# Patient Record
Sex: MALE | Race: WHITE | Employment: OTHER | URBAN - METROPOLITAN AREA
[De-identification: names, ages, dates, MRNs, and addresses within clinical notes are randomized per-mention and may not be internally consistent; named-entity substitution may affect disease eponyms.]

---

## 2018-09-26 ENCOUNTER — APPOINTMENT (OUTPATIENT)
Dept: CT IMAGING | Age: 78
DRG: 689 | End: 2018-09-26
Attending: EMERGENCY MEDICINE
Payer: MEDICARE

## 2018-09-26 ENCOUNTER — HOSPITAL ENCOUNTER (INPATIENT)
Age: 78
LOS: 6 days | Discharge: NURSING, REHAB OR LONG-TERM CARE | DRG: 689 | End: 2018-10-02
Attending: EMERGENCY MEDICINE | Admitting: FAMILY MEDICINE
Payer: MEDICARE

## 2018-09-26 DIAGNOSIS — R74.8 ELEVATED CPK: ICD-10-CM

## 2018-09-26 DIAGNOSIS — N39.0 ACUTE UTI: Primary | ICD-10-CM

## 2018-09-26 DIAGNOSIS — R79.89 ELEVATED LACTIC ACID LEVEL: ICD-10-CM
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
## 2018-09-26 PROBLEM — G93.40 ENCEPHALOPATHY: Status: ACTIVE | Noted: 2018-09-26

## 2018-09-26 PROBLEM — M62.82 RHABDOMYOLYSIS: Status: ACTIVE | Noted: 2018-09-26

## 2018-09-26 PROBLEM — E87.6 HYPOKALEMIA: Status: ACTIVE | Noted: 2018-09-26

## 2018-09-26 PROBLEM — R33.9 CHRONIC RETENTION OF URINE: Status: ACTIVE | Noted: 2018-09-26

## 2018-09-26 LAB
ALBUMIN SERPL-MCNC: 3.7 G/DL (ref 3.4–5)
ALBUMIN/GLOB SERPL: 0.8 {RATIO} (ref 0.8–1.7)
ALP SERPL-CCNC: 90 U/L (ref 45–117)
ALT SERPL-CCNC: 22 U/L (ref 16–61)
ANION GAP SERPL CALC-SCNC: 11 MMOL/L (ref 3–18)
APPEARANCE UR: ABNORMAL
AST SERPL-CCNC: 36 U/L (ref 15–37)
ATRIAL RATE: 66 BPM
BACTERIA URNS QL MICRO: ABNORMAL /HPF
BASOPHILS # BLD: 0 K/UL (ref 0–0.1)
BASOPHILS NFR BLD: 0 % (ref 0–2)
BILIRUB SERPL-MCNC: 0.8 MG/DL (ref 0.2–1)
BILIRUB UR QL: NEGATIVE
BUN SERPL-MCNC: 37 MG/DL (ref 7–18)
BUN/CREAT SERPL: 33 (ref 12–20)
CALCIUM SERPL-MCNC: 9 MG/DL (ref 8.5–10.1)
CALCULATED P AXIS, ECG09: 91 DEGREES
CALCULATED R AXIS, ECG10: 82 DEGREES
CALCULATED T AXIS, ECG11: 64 DEGREES
CHLORIDE SERPL-SCNC: 104 MMOL/L (ref 100–108)
CK MB CFR SERPL CALC: 2.5 % (ref 0–4)
CK MB SERPL-MCNC: 17.9 NG/ML (ref 5–25)
CK SERPL-CCNC: 711 U/L (ref 39–308)
CO2 SERPL-SCNC: 26 MMOL/L (ref 21–32)
COLOR UR: YELLOW
CREAT SERPL-MCNC: 1.13 MG/DL (ref 0.6–1.3)
DIAGNOSIS, 93000: NORMAL
DIFFERENTIAL METHOD BLD: ABNORMAL
EOSINOPHIL # BLD: 0 K/UL (ref 0–0.4)
EOSINOPHIL NFR BLD: 0 % (ref 0–5)
EPITH CASTS URNS QL MICRO: ABNORMAL /LPF (ref 0–5)
ERYTHROCYTE [DISTWIDTH] IN BLOOD BY AUTOMATED COUNT: 13 % (ref 11.6–14.5)
GLOBULIN SER CALC-MCNC: 4.5 G/DL (ref 2–4)
GLUCOSE SERPL-MCNC: 101 MG/DL (ref 74–99)
GLUCOSE UR STRIP.AUTO-MCNC: NEGATIVE MG/DL
HCT VFR BLD AUTO: 35.6 % (ref 36–48)
HGB BLD-MCNC: 12.2 G/DL (ref 13–16)
HGB UR QL STRIP: ABNORMAL
KETONES UR QL STRIP.AUTO: 15 MG/DL
LACTATE BLD-SCNC: 0.9 MMOL/L (ref 0.4–2)
LACTATE BLD-SCNC: 2.1 MMOL/L (ref 0.4–2)
LEUKOCYTE ESTERASE UR QL STRIP.AUTO: ABNORMAL
LIPASE SERPL-CCNC: 64 U/L (ref 73–393)
LYMPHOCYTES # BLD: 1.1 K/UL (ref 0.9–3.6)
LYMPHOCYTES NFR BLD: 12 % (ref 21–52)
MAGNESIUM SERPL-MCNC: 2.2 MG/DL (ref 1.6–2.6)
MCH RBC QN AUTO: 32 PG (ref 24–34)
MCHC RBC AUTO-ENTMCNC: 34.3 G/DL (ref 31–37)
MCV RBC AUTO: 93.4 FL (ref 74–97)
MONOCYTES # BLD: 0.8 K/UL (ref 0.05–1.2)
MONOCYTES NFR BLD: 8 % (ref 3–10)
NEUTS SEG # BLD: 7.3 K/UL (ref 1.8–8)
NEUTS SEG NFR BLD: 80 % (ref 40–73)
NITRITE UR QL STRIP.AUTO: NEGATIVE
P-R INTERVAL, ECG05: 152 MS
PH UR STRIP: 5.5 [PH] (ref 5–8)
PLATELET # BLD AUTO: 243 K/UL (ref 135–420)
PMV BLD AUTO: 9.3 FL (ref 9.2–11.8)
POTASSIUM SERPL-SCNC: 3.3 MMOL/L (ref 3.5–5.5)
PROT SERPL-MCNC: 8.2 G/DL (ref 6.4–8.2)
PROT UR STRIP-MCNC: 100 MG/DL
Q-T INTERVAL, ECG07: 426 MS
QRS DURATION, ECG06: 94 MS
QTC CALCULATION (BEZET), ECG08: 450 MS
RBC # BLD AUTO: 3.81 M/UL (ref 4.7–5.5)
RBC #/AREA URNS HPF: ABNORMAL /HPF (ref 0–5)
SODIUM SERPL-SCNC: 141 MMOL/L (ref 136–145)
SP GR UR REFRACTOMETRY: 1.02 (ref 1–1.03)
TROPONIN I SERPL-MCNC: <0.02 NG/ML (ref 0–0.04)
UROBILINOGEN UR QL STRIP.AUTO: 0.2 EU/DL (ref 0.2–1)
VENTRICULAR RATE, ECG03: 67 BPM
WBC # BLD AUTO: 9.2 K/UL (ref 4.6–13.2)
WBC URNS QL MICRO: ABNORMAL /HPF (ref 0–4)

## 2018-09-26 PROCEDURE — 83605 ASSAY OF LACTIC ACID: CPT

## 2018-09-26 PROCEDURE — 93005 ELECTROCARDIOGRAM TRACING: CPT

## 2018-09-26 PROCEDURE — 87086 URINE CULTURE/COLONY COUNT: CPT | Performed by: EMERGENCY MEDICINE

## 2018-09-26 PROCEDURE — 99285 EMERGENCY DEPT VISIT HI MDM: CPT

## 2018-09-26 PROCEDURE — 82550 ASSAY OF CK (CPK): CPT | Performed by: EMERGENCY MEDICINE

## 2018-09-26 PROCEDURE — 87040 BLOOD CULTURE FOR BACTERIA: CPT | Performed by: EMERGENCY MEDICINE

## 2018-09-26 PROCEDURE — 65270000029 HC RM PRIVATE

## 2018-09-26 PROCEDURE — 83690 ASSAY OF LIPASE: CPT | Performed by: EMERGENCY MEDICINE

## 2018-09-26 PROCEDURE — 74011250636 HC RX REV CODE- 250/636: Performed by: EMERGENCY MEDICINE

## 2018-09-26 PROCEDURE — 74011250636 HC RX REV CODE- 250/636: Performed by: NURSE PRACTITIONER

## 2018-09-26 PROCEDURE — 77030005514 HC CATH URETH FOL14 BARD -A

## 2018-09-26 PROCEDURE — 96361 HYDRATE IV INFUSION ADD-ON: CPT

## 2018-09-26 PROCEDURE — 74011000258 HC RX REV CODE- 258: Performed by: NURSE PRACTITIONER

## 2018-09-26 PROCEDURE — 77030032490 HC SLV COMPR SCD KNE COVD -B

## 2018-09-26 PROCEDURE — 87077 CULTURE AEROBIC IDENTIFY: CPT | Performed by: EMERGENCY MEDICINE

## 2018-09-26 PROCEDURE — 85025 COMPLETE CBC W/AUTO DIFF WBC: CPT | Performed by: EMERGENCY MEDICINE

## 2018-09-26 PROCEDURE — 83735 ASSAY OF MAGNESIUM: CPT | Performed by: EMERGENCY MEDICINE

## 2018-09-26 PROCEDURE — 87186 SC STD MICRODIL/AGAR DIL: CPT | Performed by: EMERGENCY MEDICINE

## 2018-09-26 PROCEDURE — 70450 CT HEAD/BRAIN W/O DYE: CPT

## 2018-09-26 PROCEDURE — 96374 THER/PROPH/DIAG INJ IV PUSH: CPT

## 2018-09-26 PROCEDURE — 80053 COMPREHEN METABOLIC PANEL: CPT | Performed by: EMERGENCY MEDICINE

## 2018-09-26 PROCEDURE — 74011250637 HC RX REV CODE- 250/637: Performed by: FAMILY MEDICINE

## 2018-09-26 PROCEDURE — 51702 INSERT TEMP BLADDER CATH: CPT

## 2018-09-26 PROCEDURE — 81001 URINALYSIS AUTO W/SCOPE: CPT | Performed by: EMERGENCY MEDICINE

## 2018-09-26 RX ORDER — ACETAMINOPHEN 325 MG/1
650 TABLET ORAL
Status: DISCONTINUED | OUTPATIENT
Start: 2018-09-26 | End: 2018-10-02 | Stop reason: HOSPADM

## 2018-09-26 RX ORDER — HYDROCODONE BITARTRATE AND ACETAMINOPHEN 5; 325 MG/1; MG/1
1 TABLET ORAL
Status: DISCONTINUED | OUTPATIENT
Start: 2018-09-26 | End: 2018-10-02 | Stop reason: HOSPADM

## 2018-09-26 RX ORDER — POTASSIUM CHLORIDE 750 MG/1
40 TABLET, FILM COATED, EXTENDED RELEASE ORAL DAILY
Status: DISCONTINUED | OUTPATIENT
Start: 2018-09-26 | End: 2018-10-02 | Stop reason: HOSPADM

## 2018-09-26 RX ORDER — CEFTRIAXONE 1 G/1
1 INJECTION, POWDER, FOR SOLUTION INTRAMUSCULAR; INTRAVENOUS
Status: COMPLETED | OUTPATIENT
Start: 2018-09-26 | End: 2018-09-26

## 2018-09-26 RX ORDER — SODIUM CHLORIDE 9 MG/ML
75 INJECTION, SOLUTION INTRAVENOUS CONTINUOUS
Status: DISCONTINUED | OUTPATIENT
Start: 2018-09-26 | End: 2018-10-02 | Stop reason: HOSPADM

## 2018-09-26 RX ORDER — HEPARIN SODIUM 5000 [USP'U]/ML
5000 INJECTION, SOLUTION INTRAVENOUS; SUBCUTANEOUS EVERY 8 HOURS
Status: DISCONTINUED | OUTPATIENT
Start: 2018-09-26 | End: 2018-10-02 | Stop reason: HOSPADM

## 2018-09-26 RX ORDER — SODIUM CHLORIDE 9 MG/ML
150 INJECTION, SOLUTION INTRAVENOUS CONTINUOUS
Status: DISCONTINUED | OUTPATIENT
Start: 2018-09-26 | End: 2018-09-26

## 2018-09-26 RX ADMIN — SODIUM CHLORIDE 75 ML/HR: 900 INJECTION, SOLUTION INTRAVENOUS at 13:47

## 2018-09-26 RX ADMIN — CEFTRIAXONE 1 G: 1 INJECTION, POWDER, FOR SOLUTION INTRAMUSCULAR; INTRAVENOUS at 13:00

## 2018-09-26 RX ADMIN — CEFTRIAXONE 1 G: 1 INJECTION, POWDER, FOR SOLUTION INTRAMUSCULAR; INTRAVENOUS at 08:45

## 2018-09-26 RX ADMIN — SODIUM CHLORIDE 1000 ML: 900 INJECTION, SOLUTION INTRAVENOUS at 08:41

## 2018-09-26 RX ADMIN — SODIUM CHLORIDE 75 ML/HR: 900 INJECTION, SOLUTION INTRAVENOUS at 23:24

## 2018-09-26 RX ADMIN — HEPARIN SODIUM 5000 UNITS: 5000 INJECTION INTRAVENOUS; SUBCUTANEOUS at 23:22

## 2018-09-26 RX ADMIN — SODIUM CHLORIDE 150 ML/HR: 900 INJECTION, SOLUTION INTRAVENOUS at 10:07

## 2018-09-26 NOTE — H&P
GENERAL GENERIC H&P/CONSULT Tristan Carolina is a 70 y.o. male with a history of Parkinson's disease, BPH who presents to the ED for complaints of fall with unknown downtime. Patient was found by daughter down on bathroom floor and apparently was last seen normal yesterday evening. Unclear if patient hit his head. Hx is limited 2/2 patient condition. Family notes patient has been steadily declining in regards to conditioning however has refused to see PCP. Patient lives by himself. Patient notes his legs gave out. In ED patient was found to have UTI. He will be admitted for further eval.  
 
 
Past Medical History:  
Diagnosis Date  Enlarged prostate  Nocturia  Parkinson's disease ([address]  Vitamin D deficiency History reviewed. No pertinent surgical history. Prior to Admission medications Not on File No Known Allergies Social History Substance Use Topics  Smoking status: Former Smoker  Smokeless tobacco: Former User  Alcohol use No  
  
History reviewed. No pertinent family history. Review of Systems Unable to perform ROS: Mental status change Objective: 
 
09/26 0701 - 09/26 1900 In: -  
Out: 1000 [Urine:1000] Patient Vitals for the past 8 hrs: 
 BP Temp Pulse Resp SpO2 Height Weight  
09/26/18 1130 111/57 - - - 100 % - -  
09/26/18 1115 - - - - 100 % - -  
09/26/18 1100 122/64 - - - (!) 89 % - -  
09/26/18 1030 118/63 - - - 97 % - -  
09/26/18 1015 - - - - 97 % - -  
09/26/18 1000 124/67 - - - - - -  
09/26/18 0930 108/68 - 66 14 100 % - -  
09/26/18 0751 128/58 97.5 °F (36.4 °C) 74 15 92 % 5' 11\" (1.803 m) 65.8 kg (145 lb) Physical Exam  
Constitutional: No distress. HENT:  
Head: Normocephalic. Eyes: Pupils are equal, round, and reactive to light. Neck: Normal range of motion. No JVD present. Cardiovascular: Normal rate. Pulmonary/Chest: Effort normal and breath sounds normal. No stridor. Abdominal: Soft.  Bowel sounds are normal.  
 Musculoskeletal: Normal range of motion. Neurological: He is alert. Skin: Skin is warm and dry. He is not diaphoretic. Labs:   
Recent Results (from the past 24 hour(s)) URINALYSIS W/ RFLX MICROSCOPIC Collection Time: 09/26/18  8:10 AM  
Result Value Ref Range Color YELLOW Appearance CLOUDY Specific gravity 1.016 1.005 - 1.030    
 pH (UA) 5.5 5.0 - 8.0 Protein 100 (A) NEG mg/dL Glucose NEGATIVE  NEG mg/dL Ketone 15 (A) NEG mg/dL Bilirubin NEGATIVE  NEG Blood MODERATE (A) NEG Urobilinogen 0.2 0.2 - 1.0 EU/dL Nitrites NEGATIVE  NEG Leukocyte Esterase LARGE (A) NEG URINE MICROSCOPIC ONLY Collection Time: 09/26/18  8:10 AM  
Result Value Ref Range WBC TOO NUMEROUS TO COUNT 0 - 4 /hpf  
 RBC 36 to 50 0 - 5 /hpf Epithelial cells 4+ 0 - 5 /lpf Bacteria 4+ (A) NEG /hpf  
EKG, 12 LEAD, INITIAL Collection Time: 09/26/18  8:17 AM  
Result Value Ref Range Ventricular Rate 67 BPM  
 Atrial Rate 66 BPM  
 P-R Interval 152 ms QRS Duration 94 ms Q-T Interval 426 ms  
 QTC Calculation (Bezet) 450 ms Calculated P Axis 91 degrees Calculated R Axis 82 degrees Calculated T Axis 64 degrees Diagnosis Sinus rhythm No previous ECGs available Confirmed by Bunny Young ([id number]) on 9/26/2018 10:27:13 AM 
  
CBC WITH AUTOMATED DIFF Collection Time: 09/26/18  8:20 AM  
Result Value Ref Range WBC 9.2 4.6 - 13.2 K/uL  
 RBC 3.81 (L) 4.70 - 5.50 M/uL  
 HGB 12.2 (L) 13.0 - 16.0 g/dL HCT 35.6 (L) 36.0 - 48.0 % MCV 93.4 74.0 - 97.0 FL  
 MCH 32.0 24.0 - 34.0 PG  
 MCHC 34.3 31.0 - 37.0 g/dL  
 RDW 13.0 11.6 - 14.5 % PLATELET 566 377 - 629 K/uL MPV 9.3 9.2 - 11.8 FL  
 NEUTROPHILS 80 (H) 40 - 73 % LYMPHOCYTES 12 (L) 21 - 52 % MONOCYTES 8 3 - 10 % EOSINOPHILS 0 0 - 5 % BASOPHILS 0 0 - 2 %  
 ABS. NEUTROPHILS 7.3 1.8 - 8.0 K/UL  
 ABS. LYMPHOCYTES 1.1 0.9 - 3.6 K/UL  
 ABS. MONOCYTES 0.8 0.05 - 1.2 K/UL ABS. EOSINOPHILS 0.0 0.0 - 0.4 K/UL  
 ABS. BASOPHILS 0.0 0.0 - 0.1 K/UL  
 DF AUTOMATED METABOLIC PANEL, COMPREHENSIVE Collection Time: 09/26/18  8:20 AM  
Result Value Ref Range Sodium 141 136 - 145 mmol/L Potassium 3.3 (L) 3.5 - 5.5 mmol/L Chloride 104 100 - 108 mmol/L  
 CO2 26 21 - 32 mmol/L Anion gap 11 3.0 - 18 mmol/L Glucose 101 (H) 74 - 99 mg/dL BUN 37 (H) 7.0 - 18 MG/DL Creatinine 1.13 0.6 - 1.3 MG/DL  
 BUN/Creatinine ratio 33 (H) 12 - 20 GFR est AA >60 >60 ml/min/1.73m2 GFR est non-AA >60 >60 ml/min/1.73m2 Calcium 9.0 8.5 - 10.1 MG/DL Bilirubin, total 0.8 0.2 - 1.0 MG/DL  
 ALT (SGPT) 22 16 - 61 U/L  
 AST (SGOT) 36 15 - 37 U/L Alk. phosphatase 90 45 - 117 U/L Protein, total 8.2 6.4 - 8.2 g/dL Albumin 3.7 3.4 - 5.0 g/dL Globulin 4.5 (H) 2.0 - 4.0 g/dL A-G Ratio 0.8 0.8 - 1.7 LIPASE Collection Time: 09/26/18  8:20 AM  
Result Value Ref Range Lipase 64 (L) 73 - 393 U/L  
CARDIAC PANEL,(CK, CKMB & TROPONIN) Collection Time: 09/26/18  8:20 AM  
Result Value Ref Range  (H) 39 - 308 U/L  
 CK - MB 17.9 (H) <3.6 ng/ml CK-MB Index 2.5 0.0 - 4.0 % Troponin-I, Qt. <0.02 0.0 - 0.045 NG/ML  
MAGNESIUM Collection Time: 09/26/18  8:20 AM  
Result Value Ref Range Magnesium 2.2 1.6 - 2.6 mg/dL CULTURE, BLOOD Collection Time: 09/26/18  8:30 AM  
Result Value Ref Range Special Requests: PERIPHERAL Culture result: PENDING   
POC LACTIC ACID Collection Time: 09/26/18  8:33 AM  
Result Value Ref Range Lactic Acid (POC) 2.1 (HH) 0.4 - 2.0 mmol/L  
CULTURE, BLOOD Collection Time: 09/26/18  8:40 AM  
Result Value Ref Range Special Requests: PERIPHERAL Culture result: PENDING   
POC LACTIC ACID Collection Time: 09/26/18 11:42 AM  
Result Value Ref Range Lactic Acid (POC) 0.9 0.4 - 2.0 mmol/L Assessment: 
Principal Problem: 
  UTI (urinary tract infection) (9/26/2018) Active Problems: Fall (9/26/2018) Chronic retention of urine (9/26/2018) Plan: 
 
Acute Metabolic Encephalpathy 2/2 UTI 
-rocephin 
-urine culture 
-HD stable -IVF 
-CT head negative Mild Rhabo 
-2/2 fall 
-monitor -IVF Fall  
-likely 2/2 dehydration/physical deconditioning 
-IVF 
-PT/OT 
-will need placement DVT prophylaxis 
-scd/teds 
-heparin sq Signed: 
Jannell Kanner, NP 9/26/2018

## 2018-09-26 NOTE — PROGRESS NOTES
Problem: Pressure Injury - Risk of 
Goal: *Prevention of pressure injury Document Salvador Scale and appropriate interventions in the flowsheet. Outcome: Progressing Towards Goal 
Pressure Injury Interventions: 
  
 
Moisture Interventions: Check for incontinence Q2 hours and as needed Activity Interventions: Pressure redistribution bed/mattress(bed type) Mobility Interventions: HOB 30 degrees or less Nutrition Interventions: Document food/fluid/supplement intake Problem: Falls - Risk of 
Goal: *Absence of Falls Document Matteo Hutton Fall Risk and appropriate interventions in the flowsheet. Outcome: Progressing Towards Goal 
Fall Risk Interventions: 
Mobility Interventions: Communicate number of staff needed for ambulation/transfer Mentation Interventions: Door open when patient unattended Elimination Interventions: Call light in reach History of Falls Interventions: Door open when patient unattended

## 2018-09-26 NOTE — IP AVS SNAPSHOT
[address] [address] 
[phone] Patient: Sun Hernandez MRN: [id number] PQF:6/68/7883 A check wilver indicates which time of day the medication should be taken. My Medications START taking these medications Instructions Each Dose to Equal  
 Morning Noon Evening Bedtime  
 acetaminophen 325 mg tablet Commonly known as:  TYLENOL Your last dose was: Your next dose is: Take 2 Tabs by mouth every four (4) hours as needed. 650 mg  
    
   
   
   
  
 amoxicillin 250 mg/5 mL suspension Commonly known as:  AMOXIL Your last dose was: Your next dose is: Take 5 mL by mouth three (3) times daily. 250 mg HYDROcodone-acetaminophen 5-325 mg per tablet Commonly known as:  David Ines Your last dose was: Your next dose is: Take 1 Tab by mouth every four (4) hours as needed. Max Daily Amount: 6 Tabs. 1 Tab Where to Get Your Medications Information on where to get these meds will be given to you by the nurse or doctor. ! Ask your nurse or doctor about these medications  
  acetaminophen 325 mg tablet  
 amoxicillin 250 mg/5 mL suspension HYDROcodone-acetaminophen 5-325 mg per tablet

## 2018-09-26 NOTE — PROGRESS NOTES
Pt arrived on unit via stretcher from ER. He is AOx3 with periodic confusion which is not his baseline according to his daughter at the bedside. Pt is usually AOx4 and independent. Pt is on room air and has a urinary atdeo catheter in place with a lot of sediment in his urine and the urine is cloudy. He is non-tele and usually straight cath's himself at home due to a history of an enlarged prostate. He has a history of Parkingson's tremors which his he states has gotten worse over the last few months.

## 2018-09-26 NOTE — PROGRESS NOTES
1916  Received bedside verbal shift report from [facility]. Pt lying in bed resting comfortably. Piv #18 to left a/c with ns infusing at 75 ml/hr. Dobbins cath to gravity with cloudy yellow urine draining. Call bell within reach, side rails up x 3, bed low and locked. No complaints offered, pt instructed to call for assistance. 2015  Notified by CNA pt refusing vital signs 12  Notified by maria guadalupe chen critical lab result pt with positive blood cultures. MD notified by Kulwant Palomo. Bedside and Verbal shift change report given to Raquel Field (oncoming nurse) by Ursula Jerry (offgoing nurse). Report included the following information SBAR, Kardex, Intake/Output, MAR and Recent Results.

## 2018-09-26 NOTE — ED NOTES
Family reports found pt on the floor this morning. Family doesn''t know how long he was on floor. Pt denies injury.

## 2018-09-26 NOTE — ED NOTES
TRANSFER - ED to INPATIENT REPORT: 
 
SBAR report made available to receiving floor on this patient being transferred to [address] [place] [address] ([facility])  for routine progression of care Admitting diagnosis UTI (urinary tract infection) Chronic retention of urine Fall Information from the following report(s) SBAR and ED Summary was made available to receiving floor. Lines:  
Peripheral IV 09/26/18 Antecubital (Active) Site Assessment Clean, dry, & intact 9/26/2018  8:15 AM  
Phlebitis Assessment 0 9/26/2018  8:15 AM  
Infiltration Assessment 0 9/26/2018  8:15 AM  
Dressing Status Clean, dry, & intact 9/26/2018  8:15 AM  
Dressing Type Transparent 9/26/2018  8:15 AM  
Hub Color/Line Status Flushed 9/26/2018  8:15 AM  
Action Taken Blood drawn 9/26/2018  8:15 AM  
  
 
Medication list confirmed with patient Opportunity for questions and clarification was provided. Patient is only aware of  place, person and situation lactic Patient is  incontinent and ambulatory with assist 
  
Valuables transported with patient Patient transported with: 
 [facility]

## 2018-09-26 NOTE — IP AVS SNAPSHOT
[address] [address] 
[phone] Patient: Terri Almaraz MRN: [id number] BRW:3/51/0171 About your hospitalization You were admitted on:  September 26, 2018 You last received care in the:  [address] [place] [address] You were discharged on:  October 2, 2018 Why you were hospitalized Your primary diagnosis was:  Uti (Urinary Tract Infection) Your diagnoses also included:  Fall, Chronic Retention Of Urine, Encephalopathy, Hypokalemia, Rhabdomyolysis, Bacteremia, Parkinson's Disease (Hcc), Bph (Benign Prostatic Hyperplasia) Follow-up Information Follow up With Details Comments Contact Info Redia Dakin, MD On 10/26/2018 [address] [address] 
[phone] 60 [facility])   [address] [place] [id number] [phone] Your Scheduled Appointments Friday October 26, 2018  8:00 AM EDT New Patient with Redia Dakin, MD  
[facility] ([address] [place] [address]) [address] [id number]  
[phone] Discharge Orders None A check wilver indicates which time of day the medication should be taken. My Medications START taking these medications Instructions Each Dose to Equal  
 Morning Noon Evening Bedtime  
 acetaminophen 325 mg tablet Commonly known as:  TYLENOL Your last dose was: Your next dose is: Take 2 Tabs by mouth every four (4) hours as needed. 650 mg  
    
   
   
   
  
 amoxicillin 250 mg/5 mL suspension Commonly known as:  AMOXIL Your last dose was: Your next dose is: Take 5 mL by mouth three (3) times daily. 250 mg HYDROcodone-acetaminophen 5-325 mg per tablet Commonly known as:  [place] Your last dose was: Your next dose is: Take 1 Tab by mouth every four (4) hours as needed. Max Daily Amount: 6 Tabs. 1 Tab Where to Get Your Medications Information on where to get these meds will be given to you by the nurse or doctor. ! Ask your nurse or doctor about these medications  
  acetaminophen 325 mg tablet  
 amoxicillin 250 mg/5 mL suspension HYDROcodone-acetaminophen 5-325 mg per tablet Opioid Education Prescription Opioids: What You Need to Know: 
 
Prescription opioids can be used to help relieve moderate-to-severe pain and are often prescribed following a surgery or injury, or for certain health conditions. These medications can be an important part of treatment but also come with serious risks. Opioids are strong pain medicines. Examples include hydrocodone, oxycodone, fentanyl, and morphine. Heroin is an example of an illegal opioid. It is important to work with your health care provider to make sure you are getting the safest, most effective care. WHAT ARE THE RISKS AND SIDE EFFECTS OF OPIOID USE? Prescription opioids carry serious risks of addiction and overdose, especially with prolonged use. An opioid overdose, often marked by slow breathing, can cause sudden death. The use of prescription opioids can have a number of side effects as well, even when taken as directed. · Tolerance-meaning you might need to take more of a medication for the same pain relief · Physical dependence-meaning you have symptoms of withdrawal when the medication is stopped. Withdrawal symptoms can include nausea, sweating, chills, diarrhea, stomach cramps, and muscle aches. Withdrawal can last up to several weeks, depending on which drug you took and how long you took it. · Increased sensitivity to pain · Constipation · Nausea, vomiting, and dry mouth · Sleepiness and dizziness · Confusion · Depression · Low levels of testosterone that can result in lower sex drive, energy, and strength · Itching and sweating RISKS ARE GREATER WITH:      
· History of drug misuse, substance use disorder, or overdose · Mental health conditions (such as depression or anxiety) · Sleep apnea · Older age (72 years or older) · Pregnancy Avoid alcohol while taking prescription opioids. Also, unless specifically advised by your health care provider, medications to avoid include: · Benzodiazepines (such as Xanax or Valium) · Muscle relaxants (such as Soma or Flexeril) · Hypnotics (such as Ambien or Lunesta) · Other prescription opioids KNOW YOUR OPTIONS Talk to your health care provider about ways to manage your pain that don't involve prescription opioids. Some of these options may actually work better and have fewer risks and side effects. Consult your physician before adding or stopping any medications, treatments, or physical activity. Options may include: 
· Pain relievers such as acetaminophen, ibuprofen, and naproxen · Some medications that are also used for depression or seizures · Physical therapy and exercise · Counseling to help patients learn how to cope better with triggers of pain and stress. · Application of heat or cold compress · Massage therapy · Relaxation techniques Be Informed Make sure you know the name of your medication, how much and how often to take it, and its potential risks & side effects. IF YOU ARE PRESCRIBED OPIOIDS FOR PAIN: 
· Never take opioids in greater amounts or more often than prescribed. Remember the goal is not to be pain-free but to manage your pain at a tolerable level. · Follow up with your primary care provider to: · Work together to create a plan on how to manage your pain. · Talk about ways to help manage your pain that don't involve prescription opioids. · Talk about any and all concerns and side effects. · Help prevent misuse and abuse. · Never sell or share prescription opioids · Help prevent misuse and abuse. · Store prescription opioids in a secure place and out of reach of others (this may include visitors, children, friends, and family). · Safely dispose of unused/unwanted prescription opioids: Find your community drug take-back program or your pharmacy mail-back program, or flush them down the toilet, following guidance from the Food and Drug Administration (www.fda.gov/Drugs/ResourcesForYou). · Visit www.cdc.gov/drugoverdose to learn about the risks of opioid abuse and overdose. · If you believe you may be struggling with addiction, tell your health care provider and ask for guidance or call [employer] at 7-044-872-PRQS. Discharge Instructions None [email or web address] Announcement We are excited to announce that we are making your provider's discharge notes available to you in [email or web address]. You will see these notes when they are completed and signed by the physician that discharged you from your recent hospital stay. If you have any questions or concerns about any information you see in [email or web address], please call the Health Information Department where you were seen or reach out to your Primary Care Provider for more information about your plan of care. Introducing [facility]! [employer] introduces Blaze.[email or web address] patient portal. Now you can access parts of your medical record, email your doctor's office, and request medication refills online. 1. In your internet browser, go to [email or web address] 2. Click on the First Time User? Click Here link in the Sign In box. You will see the New Member Sign Up page. 3. Enter your Blaze.io Access Code exactly as it appears below. You will not need to use this code after youve completed the sign-up process. If you do not sign up before the expiration date, you must request a new code.  
 
· [email or web address].io Access Code: [email or web address] 
 Expires: 12/25/2018  7:53 AM 
 
4. Enter the last four digits of your Social Security Number (xxxx) and Date of Birth (mm/dd/yyyy) as indicated and click Submit. You will be taken to the next sign-up page. 5. Create a Niterot ID. This will be your Terrajoule login ID and cannot be changed, so think of one that is secure and easy to remember. 6. Create a Terrajoule password. You can change your password at any time. 7. Enter your Password Reset Question and Answer. This can be used at a later time if you forget your password. 8. Enter your e-mail address. You will receive e-mail notification when new information is available in [address] 9. Click Sign Up. You can now view and download portions of your medical record. 10. Click the Download Summary menu link to download a portable copy of your medical information. If you have questions, please visit the Frequently Asked Questions section of the Terrajoule website. Remember, Terrajoule is NOT to be used for urgent needs. For medical emergencies, dial 911. Now available from your iPhone and Android! Introducing [facility] As a [facility] patient, I wanted to make you aware of our electronic visit tool called [facility]. [facility] 24/7 allows you to connect within minutes with a medical provider 24 hours a day, seven days a week via a mobile device or tablet or logging into a secure website from your computer. You can access [facility] from anywhere in the [place]. A virtual visit might be right for you when you have a simple condition and feel like you just dont want to get out of bed, or cant get away from work for an appointment, when your regular [facility] provider is not available (evenings, weekends or holidays), or when youre out of town and need minor care.   Electronic visits cost only $49 and if the [facility] Montes De Oca provider determines a prescription is needed to treat your condition, one can be electronically transmitted to a nearby pharmacy*. Please take a moment to enroll today if you have not already done so. The enrollment process is free and takes just a few minutes. To enroll, please download the [employer] 24/7 yossi to your tablet or phone, or visit [email or web address] to enroll on your computer. And, as an [address] [place] [address] patient with a Leversense account, the results of your visits will be scanned into your electronic medical record and your primary care provider will be able to view the scanned results. We urge you to continue to see your regular [employer] provider for your ongoing medical care. And while your primary care provider may not be the one available when you seek a Risktailjeanethfin virtual visit, the peace of mind you get from getting a real diagnosis real time can be priceless. For more information on StackAdapt, view our Frequently Asked Questions (FAQs) at [email or web address]. Sincerely, 
 
Gen Jorge MD 
Chief Medical Officer [address] [place] [address] *:  certain medications cannot be prescribed via StackAdapt Providers Seen During Your Hospitalization Provider Specialty Primary office phone Mathew Mccann MD Emergency Medicine [phone] Nila Stevenson MD Family Practice [phone] Alexandra Rojas MD Internal Medicine [phone] Your Primary Care Physician (PCP) Primary Care Physician Office Phone Office Fax Sheba Barrera [phone] You are allergic to the following No active allergies Recent Documentation Height Weight BMI Smoking Status 1.803 m 64 kg 19.68 kg/m2 Former Smoker Emergency Contacts Name Discharge Info Relation Home Work Mobile Chelsie Guillen DISCHARGE CAREGIVER [3] Child [2] [phone] Phoenix Guillen DISCHARGE CAREGIVER [3] Other Relative [6] [phone] Patient Belongings The following personal items are in your possession at time of discharge: 
  Dental Appliances: None  Visual Aid: At bedside      Home Medications: None   Jewelry: None  Clothing: None    Other Valuables: None (All sent home with patient ) Please provide this summary of care documentation to your next provider. Signatures-by signing, you are acknowledging that this After Visit Summary has been reviewed with you and you have received a copy. Patient Signature:  ____________________________________________________________ Date:  ____________________________________________________________  
  
[facility] Provider Signature:  ____________________________________________________________ Date:  ____________________________________________________________

## 2018-09-26 NOTE — PROGRESS NOTES
Patient is unable to communicate at this time due to his current condition  Spoke to family member of patient.  offered prayer . Chaplains will continue to follow and will provide pastoral care on an as needed/requested basis. Mira San Board Certified [employer] Spiritual Care  
[phone]

## 2018-09-26 NOTE — ED TRIAGE NOTES
Per daughter her father has been declining over the past year. He refuses to go see his doctors. She thinks he is dehydrated, he cathes at home. Pt reports abdominal pain .

## 2018-09-26 NOTE — PROGRESS NOTES
Reason for Admission:  UTI RRAT Score:  5 Plan for utilizing home health:   No    
                 
Likelihood of Readmission:  Moderate/yellow Transition of Care Plan:  Jacoby Stevenson Pt confused. Interviewed daughter at bedside. Pt lives alone. Pt ambulate independently but he shuffles. Daughter, Bhavna Sifuentes, assist pt with chores at home. Pt self cath for many years but lately has trouble and holds his urine. Daughter states that she tried to move pt at her house but pt refused. Pt has no PCP and daughter not sure when was the last time pt seen a MD. Pt agreed with discharge plan. Care Management Interventions PCP Verified by CM: Yes (no PCP) Palliative Care Criteria Met (RRAT>21 & CHF Dx)?: No 
Mode of Transport at Discharge: BLS Transition of Care Consult (CM Consult): SNF, Discharge Planning Physical Therapy Consult: Yes Occupational Therapy Consult: Yes Current Support Network: Lives Alone Confirm Follow Up Transport: Family Plan discussed with Pt/Family/Caregiver: Yes Discharge Location Discharge Placement: Transferred per family's request  
 
 
Patient has designated _______daughter_________________ to participate in his/her discharge plan and to receive any needed information. Name: Bhavna Sifuentes Address: 
Phone number:  [phone]

## 2018-09-27 PROBLEM — R78.81 BACTEREMIA: Status: ACTIVE | Noted: 2018-09-27

## 2018-09-27 LAB
ANION GAP SERPL CALC-SCNC: 12 MMOL/L (ref 3–18)
BUN SERPL-MCNC: 30 MG/DL (ref 7–18)
BUN/CREAT SERPL: 39 (ref 12–20)
CALCIUM SERPL-MCNC: 8.5 MG/DL (ref 8.5–10.1)
CHLORIDE SERPL-SCNC: 109 MMOL/L (ref 100–108)
CK SERPL-CCNC: 2282 U/L (ref 39–308)
CO2 SERPL-SCNC: 22 MMOL/L (ref 21–32)
CREAT SERPL-MCNC: 0.77 MG/DL (ref 0.6–1.3)
ERYTHROCYTE [DISTWIDTH] IN BLOOD BY AUTOMATED COUNT: 13.2 % (ref 11.6–14.5)
GLUCOSE SERPL-MCNC: 81 MG/DL (ref 74–99)
HCT VFR BLD AUTO: 35.7 % (ref 36–48)
HGB BLD-MCNC: 11.8 G/DL (ref 13–16)
MCH RBC QN AUTO: 31.1 PG (ref 24–34)
MCHC RBC AUTO-ENTMCNC: 33.1 G/DL (ref 31–37)
MCV RBC AUTO: 94.2 FL (ref 74–97)
PLATELET # BLD AUTO: 270 K/UL (ref 135–420)
PMV BLD AUTO: 10.5 FL (ref 9.2–11.8)
POTASSIUM SERPL-SCNC: 3.8 MMOL/L (ref 3.5–5.5)
RBC # BLD AUTO: 3.79 M/UL (ref 4.7–5.5)
SODIUM SERPL-SCNC: 143 MMOL/L (ref 136–145)
WBC # BLD AUTO: 8.7 K/UL (ref 4.6–13.2)

## 2018-09-27 PROCEDURE — 36415 COLL VENOUS BLD VENIPUNCTURE: CPT | Performed by: NURSE PRACTITIONER

## 2018-09-27 PROCEDURE — 97165 OT EVAL LOW COMPLEX 30 MIN: CPT

## 2018-09-27 PROCEDURE — 74011250636 HC RX REV CODE- 250/636: Performed by: NURSE PRACTITIONER

## 2018-09-27 PROCEDURE — 65270000029 HC RM PRIVATE

## 2018-09-27 PROCEDURE — 97530 THERAPEUTIC ACTIVITIES: CPT

## 2018-09-27 PROCEDURE — 82550 ASSAY OF CK (CPK): CPT | Performed by: FAMILY MEDICINE

## 2018-09-27 PROCEDURE — 74011000258 HC RX REV CODE- 258: Performed by: NURSE PRACTITIONER

## 2018-09-27 PROCEDURE — 85027 COMPLETE CBC AUTOMATED: CPT | Performed by: NURSE PRACTITIONER

## 2018-09-27 PROCEDURE — 97162 PT EVAL MOD COMPLEX 30 MIN: CPT

## 2018-09-27 PROCEDURE — 80048 BASIC METABOLIC PNL TOTAL CA: CPT | Performed by: NURSE PRACTITIONER

## 2018-09-27 PROCEDURE — 74011250636 HC RX REV CODE- 250/636: Performed by: INTERNAL MEDICINE

## 2018-09-27 PROCEDURE — 74011000258 HC RX REV CODE- 258: Performed by: INTERNAL MEDICINE

## 2018-09-27 PROCEDURE — 74011250637 HC RX REV CODE- 250/637: Performed by: FAMILY MEDICINE

## 2018-09-27 RX ADMIN — AMPICILLIN SODIUM 2 G: 2 INJECTION, POWDER, FOR SOLUTION INTRAMUSCULAR; INTRAVENOUS at 20:03

## 2018-09-27 RX ADMIN — AMPICILLIN SODIUM 2 G: 2 INJECTION, POWDER, FOR SOLUTION INTRAMUSCULAR; INTRAVENOUS at 23:53

## 2018-09-27 RX ADMIN — SODIUM CHLORIDE 75 ML/HR: 900 INJECTION, SOLUTION INTRAVENOUS at 13:26

## 2018-09-27 RX ADMIN — POTASSIUM CHLORIDE 40 MEQ: 750 TABLET, FILM COATED, EXTENDED RELEASE ORAL at 10:26

## 2018-09-27 RX ADMIN — AMPICILLIN SODIUM 2 G: 2 INJECTION, POWDER, FOR SOLUTION INTRAMUSCULAR; INTRAVENOUS at 16:02

## 2018-09-27 RX ADMIN — HEPARIN SODIUM 5000 UNITS: 5000 INJECTION INTRAVENOUS; SUBCUTANEOUS at 20:05

## 2018-09-27 RX ADMIN — HEPARIN SODIUM 5000 UNITS: 5000 INJECTION INTRAVENOUS; SUBCUTANEOUS at 05:55

## 2018-09-27 RX ADMIN — CEFTRIAXONE 1 G: 1 INJECTION, POWDER, FOR SOLUTION INTRAMUSCULAR; INTRAVENOUS at 13:27

## 2018-09-27 NOTE — PROGRESS NOTES
Progress Note Patient: Reva Shrestha               Sex: male          DOA: 9/26/2018 YOB: 1940      Age:  70 y.o.        LOS:  LOS: 1 day Subjective / Interval Hx I Erle Neat a 70 y. o. male with a history of Parkinson's disease, BPH who presents to the ED for complaints of fall with unknown downtime. Patient was found by daughter down on bathroom floor and apparently was last seen normal yesterday evening. Unclear if patient hit his head. Hx is limited 2/2 patient condition. Family notes patient has been steadily declining in regards to conditioning however has refused to see PCP. Patient lives by himself. Patient notes his legs gave out. In ED patient was found to have UTI. He will be admitted for further eval.  
9/27: Patient appears more confused today. 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 Blood cx + GPR.  
 
  
 
Objective:  
  
Visit Vitals  /60  Pulse 76  Temp 99.1 °F (37.3 °C)  Resp 17  Ht 5' 11\" (1.803 m)  Wt 66.1 kg (145 lb 11.2 oz)  SpO2 96%  BMI 20.32 kg/m2 Physical Exam  
Constitutional: He appears well-developed and well-nourished. He appears ill. No distress. HENT:  
Head: Normocephalic and atraumatic. Eyes: Conjunctivae are normal. Pupils are equal, round, and reactive to light. No scleral icterus. Neck: Neck supple. Cardiovascular: Normal rate, regular rhythm and normal heart sounds. No murmur heard. Pulmonary/Chest: Effort normal and breath sounds normal. No respiratory distress. He has no wheezes. He has no rales. Abdominal: Soft. Bowel sounds are normal. He exhibits no distension. There is no tenderness. There is no guarding. Musculoskeletal: He exhibits edema. Neurological: He is alert. ORIENTED TO PLACE AND PERSON Skin: He is not diaphoretic. Intake and Output: 
Current Shift:    
Last three shifts:  09/25 1901 - 09/27 0700 In: 1337.5 [I.V.:1337.5] Out: 1960 [[id number]] Recent Results (from the past 48 hour(s)) URINALYSIS W/ RFLX MICROSCOPIC Collection Time: 09/26/18  8:10 AM  
Result Value Ref Range Color YELLOW Appearance CLOUDY Specific gravity 1.016 1.005 - 1.030    
 pH (UA) 5.5 5.0 - 8.0 Protein 100 (A) NEG mg/dL Glucose NEGATIVE  NEG mg/dL Ketone 15 (A) NEG mg/dL Bilirubin NEGATIVE  NEG Blood MODERATE (A) NEG Urobilinogen 0.2 0.2 - 1.0 EU/dL Nitrites NEGATIVE  NEG Leukocyte Esterase LARGE (A) NEG URINE MICROSCOPIC ONLY Collection Time: 09/26/18  8:10 AM  
Result Value Ref Range WBC TOO NUMEROUS TO COUNT 0 - 4 /hpf  
 RBC 36 to 50 0 - 5 /hpf Epithelial cells 4+ 0 - 5 /lpf Bacteria 4+ (A) NEG /hpf  
EKG, 12 LEAD, INITIAL Collection Time: 09/26/18  8:17 AM  
Result Value Ref Range Ventricular Rate 67 BPM  
 Atrial Rate 66 BPM  
 P-R Interval 152 ms QRS Duration 94 ms Q-T Interval 426 ms  
 QTC Calculation (Bezet) 450 ms Calculated P Axis 91 degrees Calculated R Axis 82 degrees Calculated T Axis 64 degrees Diagnosis Sinus rhythm No previous ECGs available Confirmed by Bve Pelaez ([id number]) on 9/26/2018 10:27:13 AM 
  
CBC WITH AUTOMATED DIFF Collection Time: 09/26/18  8:20 AM  
Result Value Ref Range WBC 9.2 4.6 - 13.2 K/uL  
 RBC 3.81 (L) 4.70 - 5.50 M/uL  
 HGB 12.2 (L) 13.0 - 16.0 g/dL HCT 35.6 (L) 36.0 - 48.0 % MCV 93.4 74.0 - 97.0 FL  
 MCH 32.0 24.0 - 34.0 PG  
 MCHC 34.3 31.0 - 37.0 g/dL  
 RDW 13.0 11.6 - 14.5 % PLATELET 451 096 - 211 K/uL MPV 9.3 9.2 - 11.8 FL  
 NEUTROPHILS 80 (H) 40 - 73 % LYMPHOCYTES 12 (L) 21 - 52 % MONOCYTES 8 3 - 10 % EOSINOPHILS 0 0 - 5 % BASOPHILS 0 0 - 2 %  
 ABS. NEUTROPHILS 7.3 1.8 - 8.0 K/UL  
 ABS. LYMPHOCYTES 1.1 0.9 - 3.6 K/UL  
 ABS. MONOCYTES 0.8 0.05 - 1.2 K/UL  
 ABS. EOSINOPHILS 0.0 0.0 - 0.4 K/UL  
 ABS. BASOPHILS 0.0 0.0 - 0.1 K/UL  
 DF AUTOMATED METABOLIC PANEL, COMPREHENSIVE Collection Time: 09/26/18  8:20 AM  
Result Value Ref Range Sodium 141 136 - 145 mmol/L Potassium 3.3 (L) 3.5 - 5.5 mmol/L Chloride 104 100 - 108 mmol/L  
 CO2 26 21 - 32 mmol/L Anion gap 11 3.0 - 18 mmol/L Glucose 101 (H) 74 - 99 mg/dL BUN 37 (H) 7.0 - 18 MG/DL Creatinine 1.13 0.6 - 1.3 MG/DL  
 BUN/Creatinine ratio 33 (H) 12 - 20 GFR est AA >60 >60 ml/min/1.73m2 GFR est non-AA >60 >60 ml/min/1.73m2 Calcium 9.0 8.5 - 10.1 MG/DL Bilirubin, total 0.8 0.2 - 1.0 MG/DL  
 ALT (SGPT) 22 16 - 61 U/L  
 AST (SGOT) 36 15 - 37 U/L Alk. phosphatase 90 45 - 117 U/L Protein, total 8.2 6.4 - 8.2 g/dL Albumin 3.7 3.4 - 5.0 g/dL Globulin 4.5 (H) 2.0 - 4.0 g/dL A-G Ratio 0.8 0.8 - 1.7 LIPASE Collection Time: 09/26/18  8:20 AM  
Result Value Ref Range Lipase 64 (L) 73 - 393 U/L  
CARDIAC PANEL,(CK, CKMB & TROPONIN) Collection Time: 09/26/18  8:20 AM  
Result Value Ref Range  (H) 39 - 308 U/L  
 CK - MB 17.9 (H) <3.6 ng/ml CK-MB Index 2.5 0.0 - 4.0 % Troponin-I, Qt. <0.02 0.0 - 0.045 NG/ML  
MAGNESIUM Collection Time: 09/26/18  8:20 AM  
Result Value Ref Range Magnesium 2.2 1.6 - 2.6 mg/dL CULTURE, BLOOD Collection Time: 09/26/18  8:30 AM  
Result Value Ref Range Special Requests: PERIPHERAL    
 GRAM STAIN PEDS BOTTLE   
 GRAM STAIN GRAM POSITIVE RODS    
 GRAM STAIN     
  SMEAR CALLED TO AND CORRECTLY REPEATED BY: Keanu Menjivar RN IN [address] [place] [address] 9/26/18 AT 2316 TO [facility] Culture result: CULTURE IN PROGRESS,FURTHER UPDATES TO FOLLOW Culture result: Sent to [facility] for ID/Susceptibility if indicated POC LACTIC ACID Collection Time: 09/26/18  8:33 AM  
Result Value Ref Range Lactic Acid (POC) 2.1 (HH) 0.4 - 2.0 mmol/L  
CULTURE, BLOOD Collection Time: 09/26/18  8:40 AM  
Result Value Ref Range Special Requests: PERIPHERAL Culture result: NO GROWTH 1 DAY POC LACTIC ACID  
 Collection Time: 09/26/18 11:42 AM  
Result Value Ref Range Lactic Acid (POC) 0.9 0.4 - 2.0 mmol/L METABOLIC PANEL, BASIC Collection Time: 09/27/18  4:21 AM  
Result Value Ref Range Sodium 143 136 - 145 mmol/L Potassium 3.8 3.5 - 5.5 mmol/L Chloride 109 (H) 100 - 108 mmol/L  
 CO2 22 21 - 32 mmol/L Anion gap 12 3.0 - 18 mmol/L Glucose 81 74 - 99 mg/dL BUN 30 (H) 7.0 - 18 MG/DL Creatinine 0.77 0.6 - 1.3 MG/DL  
 BUN/Creatinine ratio 39 (H) 12 - 20 GFR est AA >60 >60 ml/min/1.73m2 GFR est non-AA >60 >60 ml/min/1.73m2 Calcium 8.5 8.5 - 10.1 MG/DL  
CBC W/O DIFF Collection Time: 09/27/18  4:21 AM  
Result Value Ref Range WBC 8.7 4.6 - 13.2 K/uL  
 RBC 3.79 (L) 4.70 - 5.50 M/uL  
 HGB 11.8 (L) 13.0 - 16.0 g/dL HCT 35.7 (L) 36.0 - 48.0 % MCV 94.2 74.0 - 97.0 FL  
 MCH 31.1 24.0 - 34.0 PG  
 MCHC 33.1 31.0 - 37.0 g/dL  
 RDW 13.2 11.6 - 14.5 % PLATELET 329 539 - 491 K/uL MPV 10.5 9.2 - 11.8 FL Lab/Data Reviewed: All lab results for the last 24 hours reviewed. XRays were reviewed in past 24 hours Medications Reviewed Assessment/Plan Principal Problem: 
  UTI (urinary tract infection) (9/26/2018) Active Problems: 
  Fall (9/26/2018) Chronic retention of urine (9/26/2018) Encephalopathy (9/26/2018) Hypokalemia (9/26/2018) Rhabdomyolysis (9/26/2018) CARE PLAN  
 
UTI  
- Rocephin  
- urine cx pending Encephalopathy 
- 2/2 above - CT head 9/26  noted Bacteremia - blood cx 9/26 + gpr  
- Rocephin - ID consult today will await  recommendations Rhabdomyolysis - Mild , likely traumatic  
- IVF 
- Recheck CK Hx Parkinson's - tremors - No evidence of treatment and patient hx unreliable - Neurology recommend out patient follow up on discharge Fall - PT eval recommend SNF  
- fall precaution Chronic urine retention  
- self cath every day Hypokalemia  
- replaced DVT prophylaxis Full code Disposition - tbd  
 
 
 
Bari Schlatter, MD 
September 27, 2018

## 2018-09-27 NOTE — PROGRESS NOTES
Problem: Pressure Injury - Risk of 
Goal: *Prevention of pressure injury Document Salvador Scale and appropriate interventions in the flowsheet. Outcome: Progressing Towards Goal 
Pressure Injury Interventions: 
  
 
Moisture Interventions: Maintain skin hydration (lotion/cream) Activity Interventions: PT/OT evaluation Mobility Interventions: HOB 30 degrees or less Nutrition Interventions: Document food/fluid/supplement intake Problem: Falls - Risk of 
Goal: *Absence of Falls Document Donne Skeens Fall Risk and appropriate interventions in the flowsheet. Outcome: Progressing Towards Goal 
Fall Risk Interventions: 
Mobility Interventions: Bed/chair exit alarm Mentation Interventions: Adequate sleep, hydration, pain control Elimination Interventions: Call light in reach, Bed/chair exit alarm History of Falls Interventions: Room close to nurse's station

## 2018-09-27 NOTE — PROGRESS NOTES
Nutrition initial assessment/Plan of care RECOMMENDATIONS:  
1. Regular Diet 2. Ensure Enlive TID 3. Monitor weight, labs and PO intake 4. RD to follow GOALS:  
1. PO intake meets >75% of protein/calorie needs by 10/2 
2. Weight Maintenance (+/- 1-2 lb) by 10/4 ASSESSMENT:  
Wt status is classified as normal per BMI of 20.3. However, Pt at nutrition risk d/t BMI <23 and age >65 years. Poor PO intake. Labs noted. Pt w/ elevated BUN. Nutrition recommendations listed. RD to follow. Nutrition Diagnoses:  
Inadequate energy intake related to decreased appetie  as evidenced by daughter stated very little PO intake x 3 days. Nutrition Risk:  [] High  [x] Moderate []  Low SUBJECTIVE/OBJECTIVE:  
 Pt admitted for UTI. PMHx including Parkinson's disease. Pt seen in room sleeping w/ daughter at bedside to provide Hx. Denies having any food allergies or problems chewing/swallowing PTA. Unsure of any recent weight changes, but per documented weight records Pt w/ a 5 lb or 3% change x 6 months. Reports having a poor appetite for the past 3 days with minimal PO intake. Stated appetite is variable at baseline but has not been consuming nutritional supplements at home. Will order Ensure Enlive TID to supplement energy needs. Obtained some preferences and placed in documentation. Encouraged intake and will monitor. Information Obtained from:  
 [x] Chart Review [x] Patient 
 [] Family/Caregiver 
 [] Nurse/Physician 
 [] Interdisciplinary Meeting/Rounds Diet: Regular Diet Medications: [x] Reviewed Allergies: [x] Reviewed Encounter Diagnoses ICD-10-CM ICD-9-CM 1. Acute UTI N39.0 599.0 2. Elevated CPK R74.8 790.5 3. Elevated lactic acid level R79.89 276.2 Past Medical History:  
Diagnosis Date  Enlarged prostate  Nocturia  Parkinson's disease ([facility] [address]  Vitamin D deficiency Labs:   
Lab Results Component Value Date/Time Sodium 143 09/27/2018 04:21 AM  
 Potassium 3.8 09/27/2018 04:21 AM  
 Chloride 109 (H) 09/27/2018 04:21 AM  
 CO2 22 09/27/2018 04:21 AM  
 Anion gap 12 09/27/2018 04:21 AM  
 Glucose 81 09/27/2018 04:21 AM  
 BUN 30 (H) 09/27/2018 04:21 AM  
 Creatinine 0.77 09/27/2018 04:21 AM  
 Calcium 8.5 09/27/2018 04:21 AM  
 Magnesium 2.2 09/26/2018 08:20 AM  
 Phosphorus 3.1 08/13/2010 04:55 AM  
 Albumin 3.7 09/26/2018 08:20 AM  
 
Anthropometrics: BMI (calculated): 20.3 Last 3 Recorded Weights in this Encounter  
 09/26/18 0751 09/27/18 9689 Weight: 65.8 kg (145 lb) 66.1 kg (145 lb 11.2 oz) Ht Readings from Last 1 Encounters:  
09/26/18 5' 11\" (1.803 m) Weight Metrics 9/27/2018 3/1/2018 Weight 145 lb 11.2 oz 150 lb BMI 20.32 kg/m2 20.34 kg/m2 No data found. IBW: 172 lb %IBW: 84% UBW: 145-150 lb  
[] Weight Loss [] Weight Gain [x] Weight Stable Estimated Nutrition Needs: [x] MSJ  [] Other: 
Calories: 8758-5305 kcal Based on:   [x] Actual BW   
Protein:   66-79 g Based on:   [x] Actual BW Fluid:       8863-7344 ml Based on:   [x] Actual BW  
 
 [x] No Cultural, Cheondoism or ethnic dietary need identified. [] Cultural, Cheondoism and ethnic food preferences identified and addressed Wt Status:  [x] Normal (18.6 - 24.9) [] Underweight (<18.5) [] Overweight (25 - 29.9) [] Mild Obesity (30 - 34.9)  [] Moderate Obesity (35 - 39.9) [] Morbid Obesity (40+) Nutrition Problems Identified:  
[x] Suboptimal PO intake  
[] Food Allergies [] Difficulty chewing/swallowing/poor dentition 
[] Constipation/Diarrhea  
[] Nausea/Vomiting  
[] None 
[] Other:  
 
Plan:  
[] Therapeutic Diet [x]  Obtained/adjusted food preferences/tolerances and/or snacks options [x]  Supplements added  
[] Occupational therapy following for feeding techniques []  HS snack added  
[]  Modify diet texture  
[]  Modify diet for food allergies []  Educate patient  
[]  Assist with menu selection [x]  Monitor PO intake on meal rounds  
[x]  Continue inpatient monitoring and intervention  
[]  Participated in discharge planning/Interdisciplinary rounds/Team meetings  
[]  Other:  
 
Education Needs: 
 [] Not appropriate for teaching at this time due to: 
 [x] Identified and addressed Nutrition Monitoring and Evaluation: 
[x] Continue ongoing monitoring and intervention 
[] Other Isaiah Miller

## 2018-09-27 NOTE — PROGRESS NOTES
Problem: Self Care Deficits Care Plan (Adult) Goal: *Acute Goals and Plan of Care (Insert Text) Occupational Therapy Goals Initiated 9/27/2018 within 7 day(s). 1.  Patient will perform grooming with minimal assistance/contact guard assist seated in chair. 2.  Patient will perform upper body dressing with supervision/set-up. 3.  Patient will perform lower body dressing with minimal assistance/contact guard assist. 
4.  Patient will perform toilet transfers with minimal assistance/contact guard assist. 
5.  Patient will perform all aspects of toileting with minimal assistance/contact guard assist. 
6.  Patient will participate in upper extremity therapeutic exercise/activities with minimal assistance/contact guard assist for 10 minutes. 7.  Patient will utilize energy conservation techniques during functional activities with verbal, visual and tactile cues. Occupational Therapy EVALUATION Patient: Radha Beatty (13 y.o. male) Date: 9/27/2018 Primary Diagnosis: UTI (urinary tract infection) Chronic retention of urine Fall Precautions:   Fall PLOF: independent with ADLs and transfers few weeks PTA. Has been declining functionally. ASSESSMENT : 
Based on the objective data described below, the patient presents with decreased strength, balance, safety awareness to participate with ADLs and transfers. Pt deemed unsafe to participate with OOB mobiltiy at this time due to confusion and decreased command following. Pt's daughter present at bedside provided information about ot's declining functional abilities over a few months. Attempted bed mobility to correct positioning in bed for pt. Pt required max assistance for rolling in bed. Patient will benefit from skilled intervention to address the above impairments. Patients rehabilitation potential is considered to be Fair Factors which may influence rehabilitation potential include:  
[]             None noted [x]             Mental ability/status [x]             Medical condition []             Home/family situation and support systems [x]             Safety awareness []             Pain tolerance/management 
[]             Other:  
 
Recommendations for nursing: 
Written on communication board:  
Verbally communicated to: PLAN : 
Recommendations and Planned Interventions: 
[x]               Self Care Training                  [x]        Therapeutic Activities [x]               Functional Mobility Training    []        Cognitive Retraining 
[x]               Therapeutic Exercises           []        Endurance Activities [x]               Balance Training                   []        Neuromuscular Re-Education []               Visual/Perceptual Training     [x]   Home Safety Training 
[x]               Patient Education                 [x]        Family Training/Education []               Other (comment): Frequency/Duration: Patient will be followed by occupational therapy 1-2 times per day/4-7 days per week to address goals. Discharge Recommendations: Jacoby Stevenson and To Be Determined Further Equipment Recommendations for Discharge: TBD SUBJECTIVE:  
Patient stated Hello.  OBJECTIVE DATA SUMMARY:  
 
Past Medical History:  
Diagnosis Date  Enlarged prostate  Nocturia  Parkinson's disease ([facility] [address]  Vitamin D deficiency History reviewed. No pertinent surgical history. Barriers to Learning/Limitations: yes;  altered mental status (i.e.Sedation, Confusion) Compensate with: visual, verbal, tactile, kinesthetic cues/model GCODES:  Self Care  Current  CM= 80-99%  Goal  CK= 40-59%. The severity rating is based on the Other participation with ADLs and transfers. Eval Complexity: History: MEDIUM Complexity : Expanded review of history including physical, cognitive and psychosocial  history ;  Examination: MEDIUM Complexity : 3-5 performance deficits relating to physical, cognitive , or psychosocial skils that result in activity limitations and / or participation restrictions; Decision Making:MEDIUM Complexity : Patient may present with comorbidities that affect occupational performnce. Miniml to moderate modification of tasks or assistance (eg, physical or verbal ) with assesment(s) is necessary to enable patient to complete evaluation Prior Level of Function/Home Situation:  
Home Situation Home Environment: Private residence # Steps to Enter: 2 Rails to Enter: No 
One/Two Story Residence: One story Living Alone: Yes Support Systems: Child(true), Family member(s) Patient Expects to be Discharged to[de-identified] Private residence Current DME Used/Available at Home: None Tub or Shower Type: Tub/Shower combination 
[x]  Right hand dominant   []  Left hand dominant Cognitive/Behavioral Status: 
Neurologic State: Confused Orientation Level: Oriented to person;Disoriented to place; Disoriented to situation;Disoriented to time Cognition: Decreased command following Safety/Judgement: Fall prevention Skin: intact Edema: none Vision/Perceptual:   
Coordination: 
Coordination: Generally decreased, functional 
Fine Motor Skills-Upper: Left Intact; Right Intact Gross Motor Skills-Upper: Left Intact; Right Intact Balance: 
Sitting:  (unable to assess, pt confused with decresed command followin) Strength: 
Strength: Generally decreased, functional 
Tone & Sensation: 
Sensation: Intact Range of Motion: 
AROM: Generally decreased, functional 
Functional Mobility and Transfers for ADLs: 
Bed Mobility: 
Rolling: Maximum assistance Transfers: 
Sit to Stand:  (unable to assess) ADL Assessment: 
Feeding: Minimum assistance Oral Facial Hygiene/Grooming: Moderate assistance Upper Body Dressing: Moderate assistance Lower Body Dressing: Maximum assistance Toileting: Maximum assistance ADL Intervention: 
Cognitive Retraining Safety/Judgement: Fall prevention Therapeutic Exercise: 
 
Pain: 
Pre treatment pain level:   
Post treatment pain level:  
Pain Scale 1: Visual 
Pain Intensity 1: 0 Activity Tolerance:  
Poor Please refer to the flowsheet for vital signs taken during this treatment. After treatment:  
[] Patient left in no apparent distress sitting up in chair 
[x] Patient left in no apparent distress in bed 
[x] Call bell left within reach 
[] Nursing notified 
[x] Caregiver present 
[] Bed alarm activated COMMUNICATION/EDUCATION:  
[x] Home safety education was provided and the patient/caregiver indicated understanding. [x] Patient/family have participated as able in goal setting and plan of care. [x] Patient/family agree to work toward stated goals and plan of care. [] Patient understands intent and goals of therapy, but is neutral about his/her participation. [] Patient is unable to participate in goal setting and plan of care. Thank you for this referral. 
Bill Koroma OTR/L Time Calculation: 15 mins

## 2018-09-27 NOTE — ROUTINE PROCESS
0- Assumed care. Pt in bed sleeping. NAD. 1030- Due meds given. Tolerated well. Pt is alert and oriented x 4 with delayed responses. Upper extremity tremors noted. (History of parkinson's). No c/o pain. No respiratory distress noted. Bed alarm on. Call light in reach. Bedside and Verbal shift change report given to [address] [place] [address] (oncoming nurse) by Rosendo Gill RN 
 (offgoing nurse). Report included the following information SBAR, Kardex, Procedure Summary, Intake/Output, MAR, Recent Results and Med Rec Status.

## 2018-09-27 NOTE — PROGRESS NOTES
1910  Received bedside verbal shift report from [address] [place] [address]. Pt lying in bed resting comfortably. Piv #18 to left a/c with ns infusing at 75 ml/hr. Dobbins cath to gravity with cloudy yellow urine draining. Call bell within reach, side rails up x 3, bed low and locked. No complaints offered, pt instructed to call for assistance.  
  
Bedside and Verbal shift change report given to [address] [place] [address] (oncoming nurse) by Quintin Rivas (offgoing nurse). Report included the following information SBAR, Kardex, Intake/Output, MAR and Recent Results
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
## 2018-09-27 NOTE — PROGRESS NOTES
Problem: Pressure Injury - Risk of 
Goal: *Prevention of pressure injury Document Salvador Scale and appropriate interventions in the flowsheet. Outcome: Progressing Towards Goal 
Pressure Injury Interventions: 
  
 
Moisture Interventions: Internal/External urinary devices Activity Interventions: Pressure redistribution bed/mattress(bed type) Mobility Interventions: HOB 30 degrees or less, Pressure redistribution bed/mattress (bed type) Nutrition Interventions: Document food/fluid/supplement intake Problem: Falls - Risk of 
Goal: *Absence of Falls Document Kathleen Norton Fall Risk and appropriate interventions in the flowsheet. Outcome: Progressing Towards Goal 
Fall Risk Interventions: 
Mobility Interventions: Communicate number of staff needed for ambulation/transfer Mentation Interventions: Adequate sleep, hydration, pain control, Door open when patient unattended, More frequent rounding, Reorient patient, Room close to nurse's station, Update white board Elimination Interventions: Call light in reach History of Falls Interventions: Door open when patient unattended, Evaluate medications/consider consulting pharmacy

## 2018-09-27 NOTE — CONSULTS
[facility]  MR#: [id number]  : 1940  ACCOUNT #: [de-identified]   DATE OF SERVICE: 2018    ATTENDING PHYSICIAN REQUESTING CONSULTATION:  Dr. Lisa Flores. PATIENT LOCATION:  [place] [address]. REASON FOR CONSULTATION:  UTI, confusion, gram-positive shanna in blood culture. IMPRESSION:  1. Suspect gram-negative shanna urinary tract infection -- patient presented with decreased mental status, pyuria and bacteriuria with minimal urine culture growth of more than 100,000 gram-negative rods noted. 2.  Positive blood culture for gram-positive shanna 1/2 from emergency room pediatric bottle. This certainly may be a contaminant. With mental status change, cannot certainly exclude listeria, and will cover pending more information. It is also possible this is an undecolorized gram-negative shanna, but of course will await further lab information. 3.  History of obstructive uropathy and enlarged prostate -- patient reportedly presuming intermittent straight catheterization at home with difficulty due to tremor with history of acute renal failure and obstructive uropathy dating to . Of course, with intermittent straight catheterization the patient could have asymptomatic bacteriuria but patient's mental status does not allow clarification of this and he was brought to the emergency room yesterday apparently complaining of abdominal discomfort which could have been from either distended bladder or urinary tract infection. 4.  Undiagnosed neurologic disorder characterized by the daughter as a tremor, shuffling gait, brothers with Parkinson's disease. CT of head showed no change. The daughter does report increasing confusion with hallucinations since [place]. He was found down at home the day of admission by daughter who was checking on him, last seen 12 hours prior. The patient says he was shot, which does not appear to be the case.   5.  Red feet with intact pulses, not painful or hot. May be related to tremor and being down, but will monitor for cellulitis. RECOMMENDATIONS:  1. Await urine culture result, on ceftriaxone. 2.  Begin IV ampicillin 2 grams IV q. 4h. Discussed with pharmacy, but if ID is of blood culture result is consistent with contamination or lab error, then we will plan to discontinue it. 3.  Monitor with Dobbins catheter for now. 4.  Understand neurology to see. 5.  Monitor for foot cellulitis. 6.  Thank you for this consultation. [facility] will follow with you. HISTORY OF PRESENT ILLNESS:  Amanda Conteh is a 53-year-old white male who lives alone. According to his daughter, he has not seen a physician in about a year. He was last hospitalized it appears in 2010 when he was found to have acute renal failure on the basis of obstructive uropathy. He apparently was felt to have an enlarged prostate for which he has been performing intermittent straight catheterizations since. His daughter indicates that because of increasing tremor he has been taking less in by mouth to avoid need for catheterization and that he has been in declining health for the past 1 year. She notes specifically that he has a shuffling gait and tremor. His brother has Parkinson's, but he apparently has not been diagnosed. She also interestingly reports that he has been increasingly confused with hallucinations since at least 12/2017. The patient was found on the floor by his daughter who was checking on him yesterday morning. A family member had been with him the night prior and she estimates the last the extent of his being down was no more than 12 hours. He was brought to the emergency room confused, complaining of some abdominal discomfort. Dobbins catheter was inserted with a large amount of sediment and cloudy urine noted. He was not febrile. His white count was not elevated. There was concern for urinary tract infection.   He was started on ceftriaxone after urinalysis, urine culture and blood cultures were obtained. A CT head was negative and he was continued on ceftriaxone. Today a blood culture was reported positive for gram-positive shanna. Dr. Cheri Cowan was concerned about possible listeria and requested infectious disease consultation. The patient is not a reliable historian. He tells me he was on the floor because he had been shot. He currently denies headache or neck pain or stiffness. He has trouble following simple commands. He does not report any localizing complaints, however. PAST MEDICAL HISTORY:  Medical illnesses:  Tremor, enlarged prostate, positive urine culture 03/04 for Klebsiella. Daughter unaware of any treatment. SURGERIES:  None recorded. ALLERGIES:  NO KNOWN DRUG ALLERGIES. MEDICATIONS:  Ceftriaxone, subcutaneous heparin. FAMILY HISTORY:  Parkinson's disease in a brother. SOCIAL HISTORY:  The patient lives alone, has a daughter who checks on him. He reportedly quit tobacco.    REVIEW OF SYSTEMS:  Complete review of systems was attempted. Pertinent positives and negatives I could elicit are in history of present illness. All others not able to elicit due to patient's mental status. PHYSICAL EXAMINATION:  GENERAL:  The patient is a slender white male lying on his left side in no acute distress, appearing asleep with his daughter at the bedside. VITAL SIGNS:  Temperature max 99.1, temperature is 98.7, blood pressure 134/64, pulse 61, respiratory rate 17, O2 sat is 99%. HEENT:  Pupils are constricted. Fundi are not visualized. Conjunctivae pink. Oropharynx without obvious thrush. NECK:  Supple, without lymphadenopathy or thyroidomegaly. CHEST:  Clear to auscultation and percussion. CARDIOVASCULAR:  Regular rate and rhythm. There may be a I/VI murmur along the left lower sternal border. ABDOMEN:  Soft and nontender, without hepatosplenomegaly, CVA or suprapubic tenderness.   Dobbins catheter is draining cloudy urine with large amount of sediment. EXTREMITIES:  No clubbing or cyanosis. Both feet have blanching erythema without tenderness or warmth. Dorsalis pedis pulse is palpable. NEUROLOGIC:  Patient has prominent upper extremity tremor. He withdrew his feet to noxious stimuli. The plantar reflexes appeared to be downgoing. LABORATORY DATA:  CT head, nonspecific white matter changes. Sodium 143, potassium 3.8, chloride 109, bicarbonate 22, glucose 81, BUN 30, creatinine 0.77, AST 36, ALT 22, alkaline phosphatase 90, bilirubin 0.8, total protein 8.2, albumin 3.7. White count 8700, hematocrit 36, platelet count 754, differential 80 polys, 72 lymphs, 8 monocytes. Urine culture preliminary more than 100,000 gram-negative rods. Blood culture 1 of 2 gram-positive rods. Urinalysis:  too numerous to count white cells, 4+ bacteria, 35-50 red cells, specific gravity 1.0-1.6, pH 5.5.       Jr Gil / Bettye Rae  D: 09/27/2018 14:35     T: 09/27/2018 16:17  JOB #: [id number]

## 2018-09-27 NOTE — PROGRESS NOTES
Problem: Mobility Impaired (Adult and Pediatric) Goal: *Acute Goals and Plan of Care (Insert Text) Physical Therapy Goals Initiated 9/27/2018 and to be accomplished within 7 days. 1.  Patient will complete all bed mobility with moderate assistance  in order to prepare for EOB/OOB activity. 2.  Patient will perform sit <> stand with moderate assistance  in order to prepare for OOB/gait activity. 3.  Patient will perform bed to chair transfers with moderate assistance  in order to promote mobility and encourage seated activity to progress towards their prior level of function. 4.  Patient will ambulate 15 feet with minimal assistance/contact guard assist using LRAD in order to prepare for safe negotiation of their environment. Outcome: Progressing Towards Goal 
PHYSICAL THERAPY: Initial Assessment INPATIENT: Medicare: Hospital Day: 2 Patient: Asael Frost (64 y.o. male)    Date: 9/27/2018 Primary Diagnosis: UTI (urinary tract infection) Chronic retention of urine Fall  
 ,    
Precautions: Skin, Fall PLOF: Unknown ASSESSMENT : 
Patient sleeping in R sidelying in bed, aroused to voice. Patient oriented to self only. Unable to collect social history as patient is a poor historian. Patient exhibits decreased command following. Patient has BM in bed, MAX x 1 with rolling towards L and R. Patient able to reach with upper extremities for rolling but requires assist with LE. B UE tremors noted during examination. PROM of B LES WFL; R LE hamstring tightness noted. No skin breakdown noted with examination. EOB/OOB activity deferred d/t poor command following. Patient positioned supine in bed and left with caregiver present. All needs within reach. No c/o or visual signs of pain. Recommend d/c to SNF. Patient presents with deficits in: 
Bed Mobility, Transfers, Gait, Strength and Skin Integrity/Hygiene Patient will benefit from skilled intervention to address the above impairments. Patients rehabilitation potential is considered to be Fair Factors which may influence rehabilitation potential include:  
[]         None noted 
[x]         Mental ability/status [x]         Medical condition 
[]         Home/family situation and support systems 
[]         Safety awareness 
[]         Pain tolerance/management 
[]         Other: PLAN : 
Recommendations and Planned Interventions: 
[x]           Bed Mobility Training             [x]    Neuromuscular Re-Education 
[x]           Transfer Training                   []    Orthotic/Prosthetic Training 
[x]           Gait Training                          []    Modalities [x]           Therapeutic Exercises          []    Edema Management/Control 
[x]           Therapeutic Activities            [x]    Patient and Family Training/Education 
[]           Other (comment): EDUCATION:  
Education:  Patient was educated on the following topics: Purpose of PT, strategy for bed mobility. Needs reinforcement. Barriers to Learning/Limitations: yes;  altered mental status (i.e.Sedation, Confusion) Compensate with: visual, verbal, tactile, kinesthetic cues/model Recommendations for the next treatment session: Transfers Frequency/Duration: Patient will be followed by physical therapy 3 - 5 times a week to address goals. Discharge Recommendations: Jacoby Stevenson Further Equipment Recommendations for Discharge: TBD Factors which may impact discharge planning: Medical condition, progression with PT  
 
SUBJECTIVE:  
Patient stated What do you want.  OBJECTIVE DATA SUMMARY:  
 
Past Medical History:  
Diagnosis Date  Enlarged prostate  Nocturia  Parkinson's disease ([facility] [address]  Vitamin D deficiency History reviewed. No pertinent surgical history.  
 
 
Eval Complexity: History: MEDIUM  Complexity : 1-2 comorbidities / personal factors will impact the outcome/ POC Exam:MEDIUM Complexity : 3 Standardized tests and measures addressing body structure, function, activity limitation and / or participation in recreation  Presentation: MEDIUM Complexity : Evolving with changing characteristics  Clinical Decision Making:Medium Complexity Patient requires MAX assist for mobility  Overall Complexity:MEDIUM 
 
G CODES:Mobility  Current  CM= 80-99%   Goal  CL= 60-79%. The severity rating is based on the Other Patient requires MAX assist for bed mobility, unable to participate in mobility d/t poor command following/AMS Prior Level of Function/Home Situation:  
Home Situation Home Environment: Private residence One/Two Story Residence: One story Living Alone: Yes Support Systems: Child(true) Patient Expects to be Discharged to[de-identified] Rehabilitation facility Current DME Used/Available at Home: None Critical Behavior: 
Neurologic State: Alert Orientation Level: Oriented to person;Oriented to place;Oriented to time;Disoriented to situation Cognition: Follows commands Safety/Judgement: Fall prevention Psychosocial 
Patient Behaviors: Calm; Cooperative Purposeful Interaction: Yes 
 
Manual Muscle Testing (LE) Unable to assess d/t poor command follownig Tone : normal 
Sensation: NT 
Range Of Motion: [facility] Functional Mobility: 
 
 
Functional Status Indep (I) Mod I Super-vision Min A Mod A Max A Total A Assist x2 Verbal cues Additional time Not tested Comments Rolling []  []  [] []    []    [x]  []  [x] [] [] [] Supine to sit []  []  [] []  []  []  []  [] [] [] [x] Sit to supine []  []  [] []  []  []  []  [] [] [] [x] Sit to stand []  []  [] []  []  []  []  [] [] [] [x] Stand to sit []  []  [] []  []  []  []  [] [] [] [x] Bed to chair transfers []  []  [] []  []  []  []  [] [] [] [x] Balance Good Caprice Abbott Poor Unable Not tested Comments Sitting static []  []  []  []  [x] Sitting dynamic []  []  []  []  [x] Standing static []  []  []  []  [x] Standing dynamic []  []  []  []  [x] Pain: None Vital Signs Temp: 99.1 °F (37.3 °C) Pulse (Heart Rate): 76 BP: 125/60 Resp Rate: 17    
O2 Sat (%): 96 % Activity Tolerance:  
Fair Please refer to the flowsheet for vital signs taken during this treatment. After treatment:  
[]         Patient left in no apparent distress sitting up in chair 
[x]         Patient left in no apparent distress in bed 
[x]         Call bell left within reach 
[]         Nursing notified 
[x]         Caregiver present 
[]         Bed alarm activated COMMUNICATION/EDUCATION:  
[x]         Fall prevention education was provided and the patient/caregiver indicated understanding. []         Patient/family have participated as able in goal setting and plan of care. []         Patient/family agree to work toward stated goals and plan of care. []         Patient understands intent and goals of therapy, but is neutral about his/her participation. [x]         Patient is unable to participate in goal setting and plan of care. Thank you for this referral. 
Fidencio Fang Time Calculation: 24 mins

## 2018-09-27 NOTE — PROGRESS NOTES
attempted to complete a computer request of an advance directive form with patient . Found patient not capable of completing this document and seems that he never will be able to. Spoke with a Family member and she informed me that she has a General Power of  form for patient. She says she is not sure if it covers medical or not. Suggested that she bring in a copy of that document for his records. Reiseñor 3 Board Certified [employer]  
[phone]

## 2018-09-27 NOTE — INTERDISCIPLINARY ROUNDS
IDR Summary Patient: Akila Albert MRN: [id number]    Age: 70 y.o.  : 1940 Admit Diagnosis: UTI (urinary tract infection) Chronic retention of urine Fall DIET status: Regular Lines/Tubes:  
IV: YES   Needed: YES Dobbins: YES  Needed:YES Central Line: NO Needed: NO 
 
 
VTE Prophylaxis: Chemical 
 
Mobility needs: Yes PT ordered:  YES PT eval on chart: YES   
OT ordered:  YES OT eval on chart: YES     
ST ordered:  NO ST eval on chart:  NO  
 
Disposition/Care Management: 
Discharge plan:  SNF Barriers to discharge:  
Financial concerns:No  
PCP: Hayde Castillo MD   
: NO Interventions: UTI-on zosyn Generalized rash chest/back AMS -neurology consult LOS: 1 days Expected days until discharge:  TBD Signed:  
 
Zo Hamlin NP-REZA [address] [place] [address] Hospitalist Division Pager:  [phone] Office:  [phone]

## 2018-09-28 LAB
ANION GAP SERPL CALC-SCNC: 9 MMOL/L (ref 3–18)
BACTERIA SPEC CULT: ABNORMAL
BUN SERPL-MCNC: 24 MG/DL (ref 7–18)
BUN/CREAT SERPL: 35 (ref 12–20)
CALCIUM SERPL-MCNC: 8.3 MG/DL (ref 8.5–10.1)
CHLORIDE SERPL-SCNC: 110 MMOL/L (ref 100–108)
CK SERPL-CCNC: 1346 U/L (ref 39–308)
CK SERPL-CCNC: 1866 U/L (ref 39–308)
CO2 SERPL-SCNC: 24 MMOL/L (ref 21–32)
CREAT SERPL-MCNC: 0.69 MG/DL (ref 0.6–1.3)
ERYTHROCYTE [DISTWIDTH] IN BLOOD BY AUTOMATED COUNT: 13.2 % (ref 11.6–14.5)
GLUCOSE SERPL-MCNC: 83 MG/DL (ref 74–99)
GRAM STN SPEC: ABNORMAL
HCT VFR BLD AUTO: 36.8 % (ref 36–48)
HGB BLD-MCNC: 12.2 G/DL (ref 13–16)
MCH RBC QN AUTO: 31.4 PG (ref 24–34)
MCHC RBC AUTO-ENTMCNC: 33.2 G/DL (ref 31–37)
MCV RBC AUTO: 94.8 FL (ref 74–97)
PLATELET # BLD AUTO: 258 K/UL (ref 135–420)
PMV BLD AUTO: 10.1 FL (ref 9.2–11.8)
POTASSIUM SERPL-SCNC: 3.9 MMOL/L (ref 3.5–5.5)
RBC # BLD AUTO: 3.88 M/UL (ref 4.7–5.5)
SERVICE CMNT-IMP: ABNORMAL
SODIUM SERPL-SCNC: 143 MMOL/L (ref 136–145)
WBC # BLD AUTO: 9.4 K/UL (ref 4.6–13.2)

## 2018-09-28 PROCEDURE — 65270000029 HC RM PRIVATE

## 2018-09-28 PROCEDURE — 85027 COMPLETE CBC AUTOMATED: CPT | Performed by: NURSE PRACTITIONER

## 2018-09-28 PROCEDURE — 74011000258 HC RX REV CODE- 258: Performed by: NURSE PRACTITIONER

## 2018-09-28 PROCEDURE — 74011250637 HC RX REV CODE- 250/637: Performed by: FAMILY MEDICINE

## 2018-09-28 PROCEDURE — 36415 COLL VENOUS BLD VENIPUNCTURE: CPT | Performed by: NURSE PRACTITIONER

## 2018-09-28 PROCEDURE — 82550 ASSAY OF CK (CPK): CPT | Performed by: NURSE PRACTITIONER

## 2018-09-28 PROCEDURE — 97530 THERAPEUTIC ACTIVITIES: CPT

## 2018-09-28 PROCEDURE — 74011250636 HC RX REV CODE- 250/636: Performed by: INTERNAL MEDICINE

## 2018-09-28 PROCEDURE — 74011000258 HC RX REV CODE- 258: Performed by: INTERNAL MEDICINE

## 2018-09-28 PROCEDURE — 74011250636 HC RX REV CODE- 250/636: Performed by: NURSE PRACTITIONER

## 2018-09-28 PROCEDURE — 97535 SELF CARE MNGMENT TRAINING: CPT

## 2018-09-28 PROCEDURE — 80048 BASIC METABOLIC PNL TOTAL CA: CPT | Performed by: NURSE PRACTITIONER

## 2018-09-28 RX ORDER — LORAZEPAM 2 MG/ML
1 INJECTION INTRAMUSCULAR
Status: DISCONTINUED | OUTPATIENT
Start: 2018-09-28 | End: 2018-10-02 | Stop reason: HOSPADM

## 2018-09-28 RX ADMIN — CEFTRIAXONE 1 G: 1 INJECTION, POWDER, FOR SOLUTION INTRAMUSCULAR; INTRAVENOUS at 13:35

## 2018-09-28 RX ADMIN — HEPARIN SODIUM 5000 UNITS: 5000 INJECTION INTRAVENOUS; SUBCUTANEOUS at 04:47

## 2018-09-28 RX ADMIN — AMPICILLIN SODIUM 2 G: 2 INJECTION, POWDER, FOR SOLUTION INTRAMUSCULAR; INTRAVENOUS at 04:47

## 2018-09-28 RX ADMIN — HEPARIN SODIUM 5000 UNITS: 5000 INJECTION INTRAVENOUS; SUBCUTANEOUS at 23:23

## 2018-09-28 RX ADMIN — HEPARIN SODIUM 5000 UNITS: 5000 INJECTION INTRAVENOUS; SUBCUTANEOUS at 13:35

## 2018-09-28 RX ADMIN — AMPICILLIN SODIUM 2 G: 2 INJECTION, POWDER, FOR SOLUTION INTRAMUSCULAR; INTRAVENOUS at 08:16

## 2018-09-28 NOTE — PROGRESS NOTES
Progress Note Patient: Amanda Conteh               Sex: male          DOA: 9/26/2018 YOB: 1940      Age:  70 y.o.        LOS:  LOS: 2 days Subjective / Interval Hx I Janna Jackman a 70 y. o. male with a history of Parkinson's disease, BPH who presents to the ED for complaints of fall with unknown downtime. Patient was found by daughter down on bathroom floor and apparently was last seen normal yesterday evening. Unclear if patient hit his head. Hx is limited 2/2 patient condition. Family notes patient has been steadily declining in regards to conditioning however has refused to see PCP. Patient lives by himself. Patient notes his legs gave out. In ED patient was found to have UTI. He will be admitted for further eval.  
9/27: Patient appears more confused today. 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 Blood cx + GPR. 9/28. Urine culture + GNR. ID following recommend continuing rocephin for UTI however no further follow up on blood cx .  
 
  
 
Objective:  
  
Visit Vitals  /61  Pulse 69  Temp 98.8 °F (37.1 °C)  Resp 16  
 Ht 5' 11\" (1.803 m)  Wt 65.9 kg (145 lb 3.2 oz)  SpO2 96%  BMI 20.25 kg/m2 Physical Exam  
Constitutional: He appears well-developed and well-nourished. He appears ill. No distress. HENT:  
Head: Normocephalic and atraumatic. Eyes: Conjunctivae are normal. Pupils are equal, round, and reactive to light. No scleral icterus. Neck: Neck supple. Cardiovascular: Normal rate, regular rhythm and normal heart sounds. No murmur heard. Pulmonary/Chest: Effort normal and breath sounds normal. No respiratory distress. He has no wheezes. He has no rales. Abdominal: Soft. Bowel sounds are normal. He exhibits no distension. There is no tenderness. There is no guarding. Musculoskeletal: He exhibits edema. Neurological: He is alert.   
ORIENTED TO PLACE AND PERSON   
 Skin: He is not diaphoretic. Intake and Output: 
Current Shift:    
Last three shifts:  09/26 1901 - 09/28 0700 In: 3531.3 [P.O.:480; I.V.:3051.3] Out: 2135 [Urine:2135] Recent Results (from the past 48 hour(s)) METABOLIC PANEL, BASIC Collection Time: 09/27/18  4:21 AM  
Result Value Ref Range Sodium 143 136 - 145 mmol/L Potassium 3.8 3.5 - 5.5 mmol/L Chloride 109 (H) 100 - 108 mmol/L  
 CO2 22 21 - 32 mmol/L Anion gap 12 3.0 - 18 mmol/L Glucose 81 74 - 99 mg/dL BUN 30 (H) 7.0 - 18 MG/DL Creatinine 0.77 0.6 - 1.3 MG/DL  
 BUN/Creatinine ratio 39 (H) 12 - 20 GFR est AA >60 >60 ml/min/1.73m2 GFR est non-AA >60 >60 ml/min/1.73m2 Calcium 8.5 8.5 - 10.1 MG/DL  
CBC W/O DIFF Collection Time: 09/27/18  4:21 AM  
Result Value Ref Range WBC 8.7 4.6 - 13.2 K/uL  
 RBC 3.79 (L) 4.70 - 5.50 M/uL  
 HGB 11.8 (L) 13.0 - 16.0 g/dL HCT 35.7 (L) 36.0 - 48.0 % MCV 94.2 74.0 - 97.0 FL  
 MCH 31.1 24.0 - 34.0 PG  
 MCHC 33.1 31.0 - 37.0 g/dL  
 RDW 13.2 11.6 - 14.5 % PLATELET 158 317 - 120 K/uL MPV 10.5 9.2 - 11.8 FL  
CK Collection Time: 09/27/18 12:40 PM  
Result Value Ref Range CK 2282 (H) 39 - 207 U/L  
METABOLIC PANEL, BASIC Collection Time: 09/28/18  4:33 AM  
Result Value Ref Range Sodium 143 136 - 145 mmol/L Potassium 3.9 3.5 - 5.5 mmol/L Chloride 110 (H) 100 - 108 mmol/L  
 CO2 24 21 - 32 mmol/L Anion gap 9 3.0 - 18 mmol/L Glucose 83 74 - 99 mg/dL BUN 24 (H) 7.0 - 18 MG/DL Creatinine 0.69 0.6 - 1.3 MG/DL  
 BUN/Creatinine ratio 35 (H) 12 - 20 GFR est AA >60 >60 ml/min/1.73m2 GFR est non-AA >60 >60 ml/min/1.73m2 Calcium 8.3 (L) 8.5 - 10.1 MG/DL  
CBC W/O DIFF Collection Time: 09/28/18  4:33 AM  
Result Value Ref Range WBC 9.4 4.6 - 13.2 K/uL  
 RBC 3.88 (L) 4.70 - 5.50 M/uL  
 HGB 12.2 (L) 13.0 - 16.0 g/dL HCT 36.8 36.0 - 48.0 %  MCV 94.8 74.0 - 97.0 FL  
 MCH 31.4 24.0 - 34.0 PG  
 MCHC 33.2 31.0 - 37.0 g/dL  
 RDW 13.2 11.6 - 14.5 % PLATELET 964 072 - 479 K/uL MPV 10.1 9.2 - 11.8 FL  
CK Collection Time: 09/28/18  4:33 AM  
Result Value Ref Range CK 1866 (H) 39 - 308 U/L Lab/Data Reviewed: All lab results for the last 24 hours reviewed. XRays were reviewed in past 24 hours Medications Reviewed Assessment/Plan Principal Problem: 
  UTI (urinary tract infection) (9/26/2018) Active Problems: 
  Fall (9/26/2018) Chronic retention of urine (9/26/2018) Encephalopathy (9/26/2018) Hypokalemia (9/26/2018) Rhabdomyolysis (9/26/2018) Bacteremia (9/27/2018) CARE PLAN  
 
UTI  
- Rocephin  
- urine cx + GNR Encephalopathy 
- 2/2 above - CT head 9/26  noted Bacteremia - blood cx 9/26 + gpr  
- Rocephin - ID consulted , no further recommendations d/c'd ampicillin Rhabdomyolysis - Mild , likely traumatic  
- IVF 
- Trend CK - Recheck CK daily Hx Parkinson's - tremors - No evidence of treatment and patient hx unreliable - Neurology recommend out patient follow up on discharge Fall - PT eval recommend SNF  
- fall precaution Chronic urine retention  
- tadeo cath  
- self cath PTA Hypokalemia  
- replaced DVT prophylaxis Full code Disposition - tbd  
 
 
 
Carter Alston MD 
September 28, 2018

## 2018-09-28 NOTE — PROGRESS NOTES
Problem: Pressure Injury - Risk of 
Goal: *Prevention of pressure injury Document Salvador Scale and appropriate interventions in the flowsheet. Outcome: Progressing Towards Goal 
Pressure Injury Interventions: 
Sensory Interventions: Assess changes in LOC Moisture Interventions: Absorbent underpads Activity Interventions: Pressure redistribution bed/mattress(bed type) Mobility Interventions: Pressure redistribution bed/mattress (bed type) Nutrition Interventions: Document food/fluid/supplement intake Problem: Falls - Risk of 
Goal: *Absence of Falls Document Javon Levels Fall Risk and appropriate interventions in the flowsheet. Outcome: Progressing Towards Goal 
Fall Risk Interventions: 
Mobility Interventions: PT Consult for mobility concerns Mentation Interventions: Adequate sleep, hydration, pain control Elimination Interventions: Bed/chair exit alarm History of Falls Interventions: Bed/chair exit alarm

## 2018-09-28 NOTE — INTERDISCIPLINARY ROUNDS
IDR Summary Patient: Zoë Bush MRN: [id number]    Age: 70 y.o.  : 1940 Admit Diagnosis: UTI (urinary tract infection) Chronic retention of urine Fall DIET status: Regular Lines/Tubes:  
IV: YES   Needed: YES Dobbins: YES  Needed:YES Central Line: NO Needed: NO 
 
 
VTE Prophylaxis: Chemical 
 
Mobility needs: Yes PT ordered:  YES PT eval on chart: YES   
OT ordered:  YES OT eval on chart: YES     
ST ordered:  NO ST eval on chart:  NO  
 
Disposition/Care Management: 
Discharge plan:  SNF Barriers to discharge:  
Financial concerns:No  
PCP: Laruie Gill MD   
: NO Interventions: UTI-on zosyn Generalized rash chest/back AMS -neurology consult LOS: 2 days Expected days until discharge:  TBD Signed:  
 
FELICE Jackson [address] [place], [place] [address] Hospitalist Division Pager:  [phone] Office:  [phone]
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
## 2018-09-28 NOTE — ROUTINE PROCESS
0- Assumed care. Pt in bed sleeping. NAD. Daughter at the bedside. 5846- Due meds given. Pt is too drowsy for PO meds at this point. No visual evidence of pain. No respiratory distress noted. Call light in reach. Bed alarm on. Bedside and Verbal shift change report given to Stephanie [address] (oncoming nurse) by Cherrie Hickman RN 
 (offgoing nurse). Report included the following information SBAR, Kardex, Procedure Summary, Intake/Output, MAR, Recent Results and Med Rec Status.

## 2018-09-28 NOTE — PROGRESS NOTES
Chart reviewed. Transition plan remains SNF when medically stable & bed available. Will cont to follow for further needs. Dana Marquez RN,ext [phone].

## 2018-09-28 NOTE — PROGRESS NOTES
Problem: Pressure Injury - Risk of 
Goal: *Prevention of pressure injury Document Salvador Scale and appropriate interventions in the flowsheet. Outcome: Progressing Towards Goal 
Pressure Injury Interventions: 
  
 
Moisture Interventions: Maintain skin hydration (lotion/cream) Activity Interventions: Pressure redistribution bed/mattress(bed type) Mobility Interventions: HOB 30 degrees or less, Pressure redistribution bed/mattress (bed type) Nutrition Interventions: Document food/fluid/supplement intake Problem: Falls - Risk of 
Goal: *Absence of Falls Document [facility] Fall Risk and appropriate interventions in the flowsheet. Outcome: Progressing Towards Goal 
Fall Risk Interventions: 
Mobility Interventions: Bed/chair exit alarm Mentation Interventions: Adequate sleep, hydration, pain control, Bed/chair exit alarm, Door open when patient unattended, Reorient patient, Room close to nurse's station, More frequent rounding, Update white board Elimination Interventions: Bed/chair exit alarm, Call light in reach History of Falls Interventions: Bed/chair exit alarm, Door open when patient unattended, Room close to nurse's station

## 2018-09-28 NOTE — PROGRESS NOTES
Problem: Self Care Deficits Care Plan (Adult) Goal: *Acute Goals and Plan of Care (Insert Text) Occupational Therapy Goals Initiated 9/27/2018 within 7 day(s). 1.  Patient will perform grooming with minimal assistance/contact guard assist seated in chair. 2.  Patient will perform upper body dressing with supervision/set-up. 3.  Patient will perform lower body dressing with minimal assistance/contact guard assist. 
4.  Patient will perform toilet transfers with minimal assistance/contact guard assist. 
5.  Patient will perform all aspects of toileting with minimal assistance/contact guard assist. 
6.  Patient will participate in upper extremity therapeutic exercise/activities with minimal assistance/contact guard assist for 10 minutes. 7.  Patient will utilize energy conservation techniques during functional activities with verbal, visual and tactile cues. Outcome: Progressing Towards Goal 
Occupational Therapy TREATMENT Patient: Terri Almaraz (86 y.o. male) Date: 9/28/2018 Diagnosis: UTI (urinary tract infection) Chronic retention of urine Fall UTI (urinary tract infection) Precautions: Fall Chart, occupational therapy assessment, plan of care, and goals were reviewed. PLOF: Pt reports Independent ASSESSMENT: 
Pt sleeping upon entry, arouses to voice and tactile stimuli. Pt requires increase time and Max Assist w/bed mobility to EOB 2/2 rigidity w/movement and generalized weakness. Pt agreeable to functional transfer training to [facility] w/stand pivot technique. Pt requires verbal/tactile cues for BLE advancement and assist weight shifting L/R. Poor dynamic standing balance requires Max Assist w/toileting ADL. Pt returned to supine for lunch. Alerted Flora Oropeza, pt may not be on home meds for Parkinson's limiting progress w/therapy. EDUCATION Pt educated on importance hand placement w/functional transfers Progression toward goals: []          Improving appropriately and progressing toward goals [x]          Improving slowly and progressing toward goals 
[]          Not making progress toward goals and plan of care will be adjusted PLAN: 
Patient continues to benefit from skilled intervention to address the above impairments. Continue treatment per established plan of care. Discharge Recommendations:  Jacoby Stevenson Further Equipment Recommendations for Discharge:  TBD by next level of service SUBJECTIVE:  
Patient stated I was a  on airplanes.  OBJECTIVE DATA SUMMARY: 
  
G CODES:  Self Care  Current  CL= 60-79%  Goal  CJ= 20-39%. The severity rating is based on the Other functional assessment Cognitive/Behavioral Status: 
Neurologic State: Alert Orientation Level: Oriented to person, Oriented to place Cognition: Follows commands Safety/Judgement: Fall prevention Functional Mobility and Transfers for ADLs: 
 Bed Mobility: 
Rolling: Moderate assistance Supine to Sit: Maximum assistance Sit to Supine: Moderate assistance Transfers: 
Sit to Stand: Minimum assistance (from elevated surface) Toilet Transfer : Moderate assistance (EOB <--> BSC w/SPT and HHA) Balance: 
Sitting: Intact Standing: Impaired ADL Intervention: 
Grooming Washing Face: Stand-by assistance Washing Hands: Stand-by assistance Lower Body Dressing Assistance Socks: Total assistance (dependent) Toileting Toileting Assistance: Maximum assistance Bowel Hygiene: Maximum assistance Clothing Management: Maximum assistance Pain Pre Treatment:0 Post Treatment:0 Pain Scale 1: Visual 
Pain Intensity 1: 0 Activity Tolerance:   
Poor Please refer to the flowsheet for vital signs taken during this treatment. After treatment:  
[]  Patient left in no apparent distress sitting up in chair 
[x]  Patient left in no apparent distress in bed 
[x]  Call bell left within reach [x]  Nursing notified []  Caregiver present 
[]  Bed alarm activated Lawerance Paci, [facility] Time Calculation: 30 mins

## 2018-09-28 NOTE — PROGRESS NOTES
1900 -- Bedside, Verbal and Written shift change report given to [address]  (oncoming nurse) by [employer] nurse). Report included the following information SBAR, Kardex, Intake/Output, MAR and Recent Results.  
   
2323 -- PM medications administered, pt tolerated with ease, will continue to monitor. 
  
 0000 -- Shift reassessment, pt condition unchanged, will continue to monitor. 
   
 0415 --  Shift reassessment, pt condition unchanged, will continue to monitor.   
   
0700  -- Bedside, Verbal and Written shift change report given to [address] [facility] nurse) by ROMINA (offgoing nurse). Report included the following information SBAR, Kardex, Intake/Output, MAR and Recent Results. Skin assessment completed.

## 2018-09-29 LAB
ANION GAP SERPL CALC-SCNC: 13 MMOL/L (ref 3–18)
BACTERIA SPEC CULT: ABNORMAL
BUN SERPL-MCNC: 22 MG/DL (ref 7–18)
BUN/CREAT SERPL: 39 (ref 12–20)
CALCIUM SERPL-MCNC: 8.3 MG/DL (ref 8.5–10.1)
CHLORIDE SERPL-SCNC: 108 MMOL/L (ref 100–108)
CK SERPL-CCNC: 896 U/L (ref 39–308)
CO2 SERPL-SCNC: 22 MMOL/L (ref 21–32)
CREAT SERPL-MCNC: 0.57 MG/DL (ref 0.6–1.3)
ERYTHROCYTE [DISTWIDTH] IN BLOOD BY AUTOMATED COUNT: 12.7 % (ref 11.6–14.5)
GLUCOSE SERPL-MCNC: 88 MG/DL (ref 74–99)
HCT VFR BLD AUTO: 37.4 % (ref 36–48)
HGB BLD-MCNC: 12.5 G/DL (ref 13–16)
MCH RBC QN AUTO: 31.3 PG (ref 24–34)
MCHC RBC AUTO-ENTMCNC: 33.4 G/DL (ref 31–37)
MCV RBC AUTO: 93.7 FL (ref 74–97)
PLATELET # BLD AUTO: 247 K/UL (ref 135–420)
PMV BLD AUTO: 9.8 FL (ref 9.2–11.8)
POTASSIUM SERPL-SCNC: 3.8 MMOL/L (ref 3.5–5.5)
RBC # BLD AUTO: 3.99 M/UL (ref 4.7–5.5)
SERVICE CMNT-IMP: ABNORMAL
SODIUM SERPL-SCNC: 143 MMOL/L (ref 136–145)
WBC # BLD AUTO: 9 K/UL (ref 4.6–13.2)

## 2018-09-29 PROCEDURE — 74011000258 HC RX REV CODE- 258: Performed by: NURSE PRACTITIONER

## 2018-09-29 PROCEDURE — 80048 BASIC METABOLIC PNL TOTAL CA: CPT | Performed by: NURSE PRACTITIONER

## 2018-09-29 PROCEDURE — 36415 COLL VENOUS BLD VENIPUNCTURE: CPT | Performed by: NURSE PRACTITIONER

## 2018-09-29 PROCEDURE — 74011250636 HC RX REV CODE- 250/636: Performed by: NURSE PRACTITIONER

## 2018-09-29 PROCEDURE — 82550 ASSAY OF CK (CPK): CPT | Performed by: NURSE PRACTITIONER

## 2018-09-29 PROCEDURE — 85027 COMPLETE CBC AUTOMATED: CPT | Performed by: NURSE PRACTITIONER

## 2018-09-29 PROCEDURE — 74011250637 HC RX REV CODE- 250/637: Performed by: FAMILY MEDICINE

## 2018-09-29 PROCEDURE — 65270000029 HC RM PRIVATE

## 2018-09-29 RX ADMIN — SODIUM CHLORIDE 75 ML/HR: 900 INJECTION, SOLUTION INTRAVENOUS at 13:53

## 2018-09-29 RX ADMIN — CEFTRIAXONE 1 G: 1 INJECTION, POWDER, FOR SOLUTION INTRAMUSCULAR; INTRAVENOUS at 13:53

## 2018-09-29 RX ADMIN — HEPARIN SODIUM 5000 UNITS: 5000 INJECTION INTRAVENOUS; SUBCUTANEOUS at 21:41

## 2018-09-29 RX ADMIN — POTASSIUM CHLORIDE 40 MEQ: 750 TABLET, FILM COATED, EXTENDED RELEASE ORAL at 09:51

## 2018-09-29 RX ADMIN — HEPARIN SODIUM 5000 UNITS: 5000 INJECTION INTRAVENOUS; SUBCUTANEOUS at 05:08

## 2018-09-29 RX ADMIN — HEPARIN SODIUM 5000 UNITS: 5000 INJECTION INTRAVENOUS; SUBCUTANEOUS at 13:53

## 2018-09-29 RX ADMIN — SODIUM CHLORIDE 75 ML/HR: 900 INJECTION, SOLUTION INTRAVENOUS at 00:01

## 2018-09-29 NOTE — PROGRESS NOTES
Problem: Pressure Injury - Risk of 
Goal: *Prevention of pressure injury Document Salvador Scale and appropriate interventions in the flowsheet. Outcome: Progressing Towards Goal 
Pressure Injury Interventions: 
Sensory Interventions: Assess changes in LOC Moisture Interventions: Internal/External urinary devices Activity Interventions: Pressure redistribution bed/mattress(bed type), PT/OT evaluation Mobility Interventions: PT/OT evaluation Nutrition Interventions: Document food/fluid/supplement intake Problem: Falls - Risk of 
Goal: *Absence of Falls Document Katiuska Deal Fall Risk and appropriate interventions in the flowsheet. Outcome: Progressing Towards Goal 
Fall Risk Interventions: 
Mobility Interventions: Bed/chair exit alarm, Communicate number of staff needed for ambulation/transfer Mentation Interventions: Adequate sleep, hydration, pain control, Bed/chair exit alarm, Door open when patient unattended, Eyeglasses and hearing aids Elimination Interventions: Call light in reach, Patient to call for help with toileting needs History of Falls Interventions: Bed/chair exit alarm, Door open when patient unattended, Evaluate medications/consider consulting pharmacy, Investigate reason for fall, Room close to nurse's station

## 2018-09-29 NOTE — PROGRESS NOTES
1900 -- Bedside, Verbal and Written shift change report given to [address] (oncoming nurse) by Dimitris nurse). Report included the following information SBAR, Kardex, Intake/Output, MAR and Recent Results.  
   
 2141 -- PM medications administered, pt tolerated with ease, will continue to monitor. 
  
0015 -- Shift reassessment, pt condition unchanged, will continue to monitor. 
   
0415 --  Shift reassessment, pt condition unchanged, will continue to monitor.   
   
 0700 -- Bedside, Verbal and Written shift change report given to DEVANTE (oncoming nurse) by ROMINA (offgoing nurse). Report included the following information SBAR, Kardex, Intake/Output, MAR and Recent Results. Skin assessment completed.

## 2018-09-29 NOTE — PROGRESS NOTES
Problem: Pressure Injury - Risk of 
Goal: *Prevention of pressure injury Document Salvador Scale and appropriate interventions in the flowsheet. Outcome: Progressing Towards Goal 
Pressure Injury Interventions: 
Sensory Interventions: Assess changes in LOC Moisture Interventions: Internal/External urinary devices Activity Interventions: Pressure redistribution bed/mattress(bed type), PT/OT evaluation Mobility Interventions: PT/OT evaluation Nutrition Interventions: Document food/fluid/supplement intake Problem: Falls - Risk of 
Goal: *Absence of Falls Document [place] Fall Risk and appropriate interventions in the flowsheet. Outcome: Progressing Towards Goal 
Fall Risk Interventions: 
Mobility Interventions: Bed/chair exit alarm, Communicate number of staff needed for ambulation/transfer Mentation Interventions: Adequate sleep, hydration, pain control, Bed/chair exit alarm, Door open when patient unattended, Eyeglasses and hearing aids Elimination Interventions: Call light in reach, Patient to call for help with toileting needs History of Falls Interventions: Bed/chair exit alarm, Door open when patient unattended, Evaluate medications/consider consulting pharmacy, Investigate reason for fall, Room close to nurse's station

## 2018-09-29 NOTE — PROGRESS NOTES
Progress Note Patient: Wayne Ramos               Sex: male          DOA: 9/26/2018 YOB: 1940      Age:  70 y.o.        LOS:  LOS: 3 days Subjective / Interval Hx I Desmond Perez a 70 y. o. male with a history of Parkinson's disease, BPH who presents to the ED for complaints of fall with unknown downtime. Patient was found by daughter down on bathroom floor and apparently was last seen normal yesterday evening. Unclear if patient hit his head. Hx is limited 2/2 patient condition. Family notes patient has been steadily declining in regards to conditioning however has refused to see PCP. Patient lives by himself. Patient notes his legs gave out. In ED patient was found to have UTI. He will be admitted for further eval.  
9/27: Patient appears more confused today. 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 Blood cx + GPR. 9/28. Urine culture + GNR. ID following recommend continuing rocephin for UTI however no further follow up on blood cx .  
9/29: Urine culture + E coli .  
  
 
Objective:  
  
Visit Vitals  /69  Pulse (!) 50  Temp 98.3 °F (36.8 °C)  Resp 18  Ht 5' 11\" (1.803 m)  Wt 60.6 kg (133 lb 8 oz)  SpO2 94%  BMI 18.62 kg/m2 Physical Exam  
Constitutional: He appears well-developed and well-nourished. He appears ill. No distress. HENT:  
Head: Normocephalic and atraumatic. Eyes: Conjunctivae are normal. Pupils are equal, round, and reactive to light. No scleral icterus. Neck: Neck supple. Cardiovascular: Normal rate, regular rhythm and normal heart sounds. No murmur heard. Pulmonary/Chest: Effort normal and breath sounds normal. No respiratory distress. He has no wheezes. He has no rales. Abdominal: Soft. Bowel sounds are normal. He exhibits no distension. There is no tenderness. There is no guarding. Musculoskeletal: He exhibits edema. Neurological: He is alert. ORIENTED TO PLACE AND PERSON Skin: He is not diaphoretic. Intake and Output: 
Current Shift:    
Last three shifts:  09/27 1901 - 09/29 0700 In: 2897.5 [I.V.:2897.5] Out: 1250 [KHSCB:9063] Recent Results (from the past 48 hour(s)) CK Collection Time: 09/27/18 12:40 PM  
Result Value Ref Range CK 2282 (H) 39 - 482 U/L  
METABOLIC PANEL, BASIC Collection Time: 09/28/18  4:33 AM  
Result Value Ref Range Sodium 143 136 - 145 mmol/L Potassium 3.9 3.5 - 5.5 mmol/L Chloride 110 (H) 100 - 108 mmol/L  
 CO2 24 21 - 32 mmol/L Anion gap 9 3.0 - 18 mmol/L Glucose 83 74 - 99 mg/dL BUN 24 (H) 7.0 - 18 MG/DL Creatinine 0.69 0.6 - 1.3 MG/DL  
 BUN/Creatinine ratio 35 (H) 12 - 20 GFR est AA >60 >60 ml/min/1.73m2 GFR est non-AA >60 >60 ml/min/1.73m2 Calcium 8.3 (L) 8.5 - 10.1 MG/DL  
CBC W/O DIFF Collection Time: 09/28/18  4:33 AM  
Result Value Ref Range WBC 9.4 4.6 - 13.2 K/uL  
 RBC 3.88 (L) 4.70 - 5.50 M/uL  
 HGB 12.2 (L) 13.0 - 16.0 g/dL HCT 36.8 36.0 - 48.0 % MCV 94.8 74.0 - 97.0 FL  
 MCH 31.4 24.0 - 34.0 PG  
 MCHC 33.2 31.0 - 37.0 g/dL  
 RDW 13.2 11.6 - 14.5 % PLATELET 103 325 - 025 K/uL MPV 10.1 9.2 - 11.8 FL  
CK Collection Time: 09/28/18  4:33 AM  
Result Value Ref Range CK 1866 (H) 39 - 308 U/L  
CK Collection Time: 09/28/18 12:40 PM  
Result Value Ref Range CK 1346 (H) 39 - 728 U/L  
METABOLIC PANEL, BASIC Collection Time: 09/29/18  5:35 AM  
Result Value Ref Range Sodium 143 136 - 145 mmol/L Potassium 3.8 3.5 - 5.5 mmol/L Chloride 108 100 - 108 mmol/L  
 CO2 22 21 - 32 mmol/L Anion gap 13 3.0 - 18 mmol/L Glucose 88 74 - 99 mg/dL BUN 22 (H) 7.0 - 18 MG/DL Creatinine 0.57 (L) 0.6 - 1.3 MG/DL  
 BUN/Creatinine ratio 39 (H) 12 - 20 GFR est AA >60 >60 ml/min/1.73m2 GFR est non-AA >60 >60 ml/min/1.73m2 Calcium 8.3 (L) 8.5 - 10.1 MG/DL  
CBC W/O DIFF  Collection Time: 09/29/18  5:35 AM  
 Result Value Ref Range WBC 9.0 4.6 - 13.2 K/uL  
 RBC 3.99 (L) 4.70 - 5.50 M/uL  
 HGB 12.5 (L) 13.0 - 16.0 g/dL HCT 37.4 36.0 - 48.0 % MCV 93.7 74.0 - 97.0 FL  
 MCH 31.3 24.0 - 34.0 PG  
 MCHC 33.4 31.0 - 37.0 g/dL  
 RDW 12.7 11.6 - 14.5 % PLATELET 024 646 - 699 K/uL MPV 9.8 9.2 - 11.8 FL  
CK Collection Time: 09/29/18  5:35 AM  
Result Value Ref Range  (H) 39 - 308 U/L Lab/Data Reviewed: All lab results for the last 24 hours reviewed. XRays were reviewed in past 24 hours Medications Reviewed Assessment/Plan Principal Problem: 
  UTI (urinary tract infection) (9/26/2018) Active Problems: 
  Fall (9/26/2018) Chronic retention of urine (9/26/2018) Encephalopathy (9/26/2018) Hypokalemia (9/26/2018) Rhabdomyolysis (9/26/2018) Bacteremia (9/27/2018) CARE PLAN  
 
UTI  
- Rocephin  
- urine cx + E coli sensitivity noted - switch from rocephin  to po Levaquin Encephalopathy 
- improving - 2/2 above - CT head 9/26  noted Bacteremia - blood cx 9/26 + gnr 
- Rocephin - ID consulted , no further recommendations d/c'd ampicillin Rhabdomyolysis - Mild , likely traumatic  
- IVF 
- Trend CK - Recheck CK daily Hx Parkinson's - tremors - No evidence of treatment and patient hx unreliable - Neurology recommend out patient follow up on discharge Fall - PT eval recommend SNF  
- fall precaution Chronic urine retention  
- tadeo cath  
- self cath PTA Hypokalemia  
- replaced DVT prophylaxis Full code Disposition - SNF  1-2 days .  working on placement Brock Nunez MD 
September 29, 2018

## 2018-09-30 LAB — CK SERPL-CCNC: 544 U/L (ref 39–308)

## 2018-09-30 PROCEDURE — 74011250636 HC RX REV CODE- 250/636: Performed by: NURSE PRACTITIONER

## 2018-09-30 PROCEDURE — 82550 ASSAY OF CK (CPK): CPT | Performed by: FAMILY MEDICINE

## 2018-09-30 PROCEDURE — 36415 COLL VENOUS BLD VENIPUNCTURE: CPT | Performed by: FAMILY MEDICINE

## 2018-09-30 PROCEDURE — 77030011256 HC DRSG MEPILEX <16IN NO BORD MOLN -A

## 2018-09-30 PROCEDURE — 74011250637 HC RX REV CODE- 250/637: Performed by: FAMILY MEDICINE

## 2018-09-30 PROCEDURE — 65270000029 HC RM PRIVATE

## 2018-09-30 RX ORDER — LEVOFLOXACIN 750 MG/1
750 TABLET ORAL EVERY 24 HOURS
Status: DISCONTINUED | OUTPATIENT
Start: 2018-09-30 | End: 2018-10-01

## 2018-09-30 RX ADMIN — HEPARIN SODIUM 5000 UNITS: 5000 INJECTION INTRAVENOUS; SUBCUTANEOUS at 15:49

## 2018-09-30 RX ADMIN — HEPARIN SODIUM 5000 UNITS: 5000 INJECTION INTRAVENOUS; SUBCUTANEOUS at 05:13

## 2018-09-30 RX ADMIN — HEPARIN SODIUM 5000 UNITS: 5000 INJECTION INTRAVENOUS; SUBCUTANEOUS at 21:02

## 2018-09-30 RX ADMIN — LEVOFLOXACIN 750 MG: 750 TABLET, FILM COATED ORAL at 11:12

## 2018-09-30 NOTE — ROUTINE PROCESS
Bedside, Verbal and Written shift change report given to Day Uribe RN (oncoming nurse) by Howard Alvarez RN (offgoing nurse). Report included the following information SBAR, Kardex, Intake/Output, MAR, Recent Results, Med Rec Status, Procedure Summary and Cardiac Rhythm Non-Telemetry. 
   
0740 - Shift assessment completed. Pt alert and oriented x1 to self, pt confused. No respiratory distress noted. No c/o pain reported. Call bell within reach, bed in low position. Will continue to monitor. 
   
1240 - Shift re-assessment completed, no change in pt condition. 
   
1640 - Shift re-assessment completed, no change in pt condition. 
   
1900 - Pt pulled out PIV #18 to left antecubital. 
 
1930 - Pt had new PIV #20 placed to right forearm. Bedside, Verbal and Written shift change report given to Howard Alvarez RN (oncoming nurse) by Day Uribe RN (offgoing nurse). Report included the following information SBAR, Kardex, Intake/Output, MAR, Recent Results, Med Rec Status, Procedure Summary and Cardiac Rhythm Non-Telemetry.

## 2018-09-30 NOTE — PROGRESS NOTES
Progress Note Patient: Meredith Stanley               Sex: male          DOA: 9/26/2018 YOB: 1940      Age:  70 y.o.        LOS:  LOS: 4 days Subjective / Interval Hx I Rosio Rascon a 70 y. o. male with a history of Parkinson's disease, BPH who presents to the ED for complaints of fall with unknown downtime. Patient was found by daughter down on bathroom floor and apparently was last seen normal yesterday evening. Unclear if patient hit his head. Hx is limited 2/2 patient condition. Family notes patient has been steadily declining in regards to conditioning however has refused to see PCP. Patient lives by himself. Patient notes his legs gave out. In ED patient was found to have UTI. He will be admitted for further eval.  
9/27: Patient appears more confused today. 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 Blood cx + GPR. 9/28. Urine culture + GNR. ID following recommend continuing rocephin for UTI however no further follow up on blood cx . Urine culture + E coli  With sensitivity noted. 9/30: Patient will be switched from Rocephin IV to po Levaquin today. Patient at his baseline . I spoke with the daughter today , she confirmed the parkinson is non diagnosed for over 10 years. He also had bph and is on /off with urology. He self cath him self.  
  
 
Objective:  
  
Visit Vitals  /67  Pulse 64  Temp 97.7 °F (36.5 °C)  Resp 18  Ht 5' 11\" (1.803 m)  Wt 60.6 kg (133 lb 8 oz)  SpO2 97%  BMI 18.62 kg/m2 Physical Exam  
Constitutional: He appears well-developed and well-nourished. He appears ill. No distress. HENT:  
Head: Normocephalic and atraumatic. Eyes: Conjunctivae are normal. Pupils are equal, round, and reactive to light. No scleral icterus. Neck: Neck supple. Cardiovascular: Normal rate, regular rhythm and normal heart sounds. No murmur heard. Pulmonary/Chest: Effort normal and breath sounds normal. No respiratory distress. He has no wheezes. He has no rales. Abdominal: Soft. Bowel sounds are normal. He exhibits no distension. There is no tenderness. There is no guarding. Musculoskeletal: He exhibits edema. Neurological: He is alert. ORIENTED TO PLACE AND PERSON Skin: He is not diaphoretic. Intake and Output: 
Current Shift:    
Last three shifts:  09/28 1901 - 09/30 0700 In: 3197.5 [P.O.:500; I.V.:2697.5] Out: 1975 [[id number]] Recent Results (from the past 48 hour(s)) CK Collection Time: 09/28/18 12:40 PM  
Result Value Ref Range CK 1346 (H) 39 - 282 U/L  
METABOLIC PANEL, BASIC Collection Time: 09/29/18  5:35 AM  
Result Value Ref Range Sodium 143 136 - 145 mmol/L Potassium 3.8 3.5 - 5.5 mmol/L Chloride 108 100 - 108 mmol/L  
 CO2 22 21 - 32 mmol/L Anion gap 13 3.0 - 18 mmol/L Glucose 88 74 - 99 mg/dL BUN 22 (H) 7.0 - 18 MG/DL Creatinine 0.57 (L) 0.6 - 1.3 MG/DL  
 BUN/Creatinine ratio 39 (H) 12 - 20 GFR est AA >60 >60 ml/min/1.73m2 GFR est non-AA >60 >60 ml/min/1.73m2 Calcium 8.3 (L) 8.5 - 10.1 MG/DL  
CBC W/O DIFF Collection Time: 09/29/18  5:35 AM  
Result Value Ref Range WBC 9.0 4.6 - 13.2 K/uL  
 RBC 3.99 (L) 4.70 - 5.50 M/uL  
 HGB 12.5 (L) 13.0 - 16.0 g/dL HCT 37.4 36.0 - 48.0 % MCV 93.7 74.0 - 97.0 FL  
 MCH 31.3 24.0 - 34.0 PG  
 MCHC 33.4 31.0 - 37.0 g/dL  
 RDW 12.7 11.6 - 14.5 % PLATELET 431 627 - 883 K/uL MPV 9.8 9.2 - 11.8 FL  
CK Collection Time: 09/29/18  5:35 AM  
Result Value Ref Range  (H) 39 - 308 U/L  
CK Collection Time: 09/30/18  5:15 AM  
Result Value Ref Range  (H) 39 - 308 U/L Lab/Data Reviewed: All lab results for the last 24 hours reviewed. XRays were reviewed in past 24 hours Medications Reviewed Assessment/Plan Principal Problem: 
  UTI (urinary tract infection) (9/26/2018) Active Problems: 
  Fall (9/26/2018) Chronic retention of urine (9/26/2018) Encephalopathy (9/26/2018) Hypokalemia (9/26/2018) Rhabdomyolysis (9/26/2018) Bacteremia (9/27/2018) Hx BPH  
 
CARE PLAN  
 
UTI  
- complicated UTI  
- Rocephin  
- urine cx + E coli sensitivity noted - switch from rocephin  to po Levaquin today for another 5 days Encephalopathy 
- improving - 2/2 above - CT head 9/26  noted Bacteremia - blood cx 9/26 + gnr 
- Rocephin - ID consulted , no further recommendations d/c'd ampicillin Rhabdomyolysis - Mild , likely traumatic  
- IVF 
- CK 2282 on admission 
-  CK trending down 544 today - Recheck CK daily Hx Parkinson's - tremors - No evidence of treatment and patient hx unreliable - Neurology recommend out patient follow up on discharge Hx BPH  
- Follow up with urology out patient Fall - PT eval recommend SNF  
- fall precaution Chronic urine retention  
- tadeo cath  
- self cath PTA Hypokalemia  
- replaced DVT prophylaxis Full code Disposition - SNF 10/1 .  working on placement. Baudilio Jordan MD 
September 30, 2018

## 2018-09-30 NOTE — PROGRESS NOTES
Problem: Pressure Injury - Risk of 
Goal: *Prevention of pressure injury Document Salvador Scale and appropriate interventions in the flowsheet. Outcome: Progressing Towards Goal 
Pressure Injury Interventions: 
Sensory Interventions: Assess changes in LOC, Avoid rigorous massage over bony prominences, Check visual cues for pain, Discuss PT/OT consult with provider, Keep linens dry and wrinkle-free, Maintain/enhance activity level, Minimize linen layers, Monitor skin under medical devices, Pad between skin to skin, Pressure redistribution bed/mattress (bed type), Turn and reposition approx. every two hours (pillows and wedges if needed) Moisture Interventions: Absorbent underpads, Check for incontinence Q2 hours and as needed, Internal/External urinary devices, Limit adult briefs, Maintain skin hydration (lotion/cream), Minimize layers, Moisture barrier Activity Interventions: Increase time out of bed, Pressure redistribution bed/mattress(bed type), PT/OT evaluation Mobility Interventions: HOB 30 degrees or less, Pressure redistribution bed/mattress (bed type), PT/OT evaluation, Turn and reposition approx. every two hours(pillow and wedges) Nutrition Interventions: Document food/fluid/supplement intake, Discuss nutritional consult with provider, Offer support with meals,snacks and hydration Friction and Shear Interventions: HOB 30 degrees or less, Lift sheet, Lift team/patient mobility team, Minimize layers, Transfer aides:transfer board/Gilberto lift/ceiling lift, Transferring/repositioning devices Problem: Falls - Risk of 
Goal: *Absence of Falls Document [place] Fall Risk and appropriate interventions in the flowsheet.   
Outcome: Progressing Towards Goal 
Fall Risk Interventions: 
Mobility Interventions: Assess mobility with egress test, Communicate number of staff needed for ambulation/transfer, OT consult for ADLs, Patient to call before getting OOB, PT Consult for mobility concerns, PT Consult for assist device competence, Strengthening exercises (ROM-active/passive), Utilize walker, cane, or other assistive device, Utilize gait belt for transfers/ambulation Mentation Interventions: Adequate sleep, hydration, pain control, Bed/chair exit alarm, Door open when patient unattended, Eyeglasses and hearing aids, Familiar objects from home, Family/sitter at bedside, Gait belt with transfers/ambulation, Increase mobility, More frequent rounding, Reorient patient, Room close to nurse's station, Toileting rounds, Update white board Medication Interventions: Patient to call before getting OOB, Teach patient to arise slowly, Utilize gait belt for transfers/ambulation, Bed/chair exit alarm Elimination Interventions: Call light in reach, Bed/chair exit alarm, Patient to call for help with toileting needs, Toilet paper/wipes in reach, Toileting schedule/hourly rounds, Urinal in reach History of Falls Interventions: Bed/chair exit alarm, Consult care management for discharge planning, Door open when patient unattended, Investigate reason for fall, Room close to nurse's station, Utilize gait belt for transfer/ambulation

## 2018-09-30 NOTE — PROGRESS NOTES
1900  -- Bedside, Verbal and Written shift change report given to [facility] (oncoming nurse) by [facility]  (offgoing nurse). Report included the following information SBAR, Kardex, Intake/Output, MAR and Recent Results.  
   
 2102 -- PM medications administered, pt tolerated with ease, will continue to monitor. 
  
 0000 -- Shift reassessment, pt condition unchanged, will continue to monitor. 
   
0500 --  Shift reassessment, pt condition unchanged.   
   
 0700 -- Bedside, Verbal and Written shift change report given to [facility]) by ROMINA (offgoing nurse). Report included the following information SBAR, Kardex, Intake/Output, MAR and Recent Results. Skin assessment completed.

## 2018-09-30 NOTE — PROGRESS NOTES
Problem: Pressure Injury - Risk of 
Goal: *Prevention of pressure injury Document Salvador Scale and appropriate interventions in the flowsheet. Outcome: Progressing Towards Goal 
Pressure Injury Interventions: 
Sensory Interventions: Assess changes in LOC, Avoid rigorous massage over bony prominences, Check visual cues for pain, Discuss PT/OT consult with provider, Keep linens dry and wrinkle-free, Maintain/enhance activity level, Minimize linen layers, Monitor skin under medical devices, Pad between skin to skin, Pressure redistribution bed/mattress (bed type), Turn and reposition approx. every two hours (pillows and wedges if needed) Moisture Interventions: Absorbent underpads, Check for incontinence Q2 hours and as needed, Internal/External urinary devices, Limit adult briefs, Maintain skin hydration (lotion/cream), Minimize layers, Moisture barrier Activity Interventions: Increase time out of bed, Pressure redistribution bed/mattress(bed type), PT/OT evaluation Mobility Interventions: HOB 30 degrees or less, Pressure redistribution bed/mattress (bed type), PT/OT evaluation, Turn and reposition approx. every two hours(pillow and wedges) Nutrition Interventions: Document food/fluid/supplement intake, Discuss nutritional consult with provider, Offer support with meals,snacks and hydration Friction and Shear Interventions: HOB 30 degrees or less, Lift sheet, Lift team/patient mobility team, Minimize layers, Transfer aides:transfer board/Gilberto lift/ceiling lift, Transferring/repositioning devices Problem: Falls - Risk of 
Goal: *Absence of Falls Document [place] Fall Risk and appropriate interventions in the flowsheet.   
Outcome: Progressing Towards Goal 
Fall Risk Interventions: 
Mobility Interventions: Assess mobility with egress test, Communicate number of staff needed for ambulation/transfer, OT consult for ADLs, Patient to call before getting OOB, PT Consult for mobility concerns, PT Consult for assist device competence, Strengthening exercises (ROM-active/passive), Utilize walker, cane, or other assistive device, Utilize gait belt for transfers/ambulation, Bed/chair exit alarm Mentation Interventions: Adequate sleep, hydration, pain control, Bed/chair exit alarm, Door open when patient unattended, Familiar objects from home, Family/sitter at bedside, Gait belt with transfers/ambulation, Increase mobility, More frequent rounding, Reorient patient, Room close to nurse's station, Toileting rounds, Update white board Medication Interventions: Bed/chair exit alarm, Patient to call before getting OOB, Teach patient to arise slowly, Utilize gait belt for transfers/ambulation Elimination Interventions: Call light in reach, Bed/chair exit alarm, Patient to call for help with toileting needs, Toilet paper/wipes in reach, Toileting schedule/hourly rounds History of Falls Interventions: Bed/chair exit alarm, Consult care management for discharge planning, Door open when patient unattended, Investigate reason for fall, Room close to nurse's station, Utilize gait belt for transfer/ambulation

## 2018-09-30 NOTE — PROGRESS NOTES
Problem: Pressure Injury - Risk of 
Goal: *Prevention of pressure injury Document Salvador Scale and appropriate interventions in the flowsheet. Outcome: Progressing Towards Goal 
Pressure Injury Interventions: 
Sensory Interventions: Assess changes in LOC, Avoid rigorous massage over bony prominences, Check visual cues for pain, Discuss PT/OT consult with provider, Keep linens dry and wrinkle-free, Maintain/enhance activity level, Minimize linen layers, Monitor skin under medical devices, Pad between skin to skin, Pressure redistribution bed/mattress (bed type), Turn and reposition approx. every two hours (pillows and wedges if needed) Moisture Interventions: Absorbent underpads, Check for incontinence Q2 hours and as needed, Internal/External urinary devices, Limit adult briefs, Maintain skin hydration (lotion/cream), Minimize layers, Moisture barrier Activity Interventions: Increase time out of bed, Pressure redistribution bed/mattress(bed type), PT/OT evaluation Mobility Interventions: HOB 30 degrees or less, Pressure redistribution bed/mattress (bed type), PT/OT evaluation, Turn and reposition approx. every two hours(pillow and wedges) Nutrition Interventions: Document food/fluid/supplement intake, Discuss nutritional consult with provider, Offer support with meals,snacks and hydration Friction and Shear Interventions: HOB 30 degrees or less, Lift sheet, Lift team/patient mobility team, Minimize layers, Transfer aides:transfer board/Gilberto lift/ceiling lift, Transferring/repositioning devices Problem: Falls - Risk of 
Goal: *Absence of Falls Document [place] Fall Risk and appropriate interventions in the flowsheet.   
Outcome: Progressing Towards Goal 
Fall Risk Interventions: 
Mobility Interventions: Assess mobility with egress test, Communicate number of staff needed for ambulation/transfer, OT consult for ADLs, Patient to call before getting OOB, PT Consult for mobility concerns, PT Consult for assist device competence, Strengthening exercises (ROM-active/passive), Utilize walker, cane, or other assistive device, Utilize gait belt for transfers/ambulation Mentation Interventions: Adequate sleep, hydration, pain control, Bed/chair exit alarm, Door open when patient unattended, Eyeglasses and hearing aids, Familiar objects from home, Family/sitter at bedside, Gait belt with transfers/ambulation, Increase mobility, More frequent rounding, Reorient patient, Room close to nurse's station, Toileting rounds, Update white board Medication Interventions: Patient to call before getting OOB, Teach patient to arise slowly, Utilize gait belt for transfers/ambulation, Bed/chair exit alarm Elimination Interventions: Call light in reach, Bed/chair exit alarm, Patient to call for help with toileting needs, Toilet paper/wipes in reach, Toileting schedule/hourly rounds, Urinal in reach History of Falls Interventions: Bed/chair exit alarm, Consult care management for discharge planning, Door open when patient unattended, Investigate reason for fall, Room close to nurse's station, Utilize gait belt for transfer/ambulation

## 2018-09-30 NOTE — ROUTINE PROCESS
Bedside, Verbal and Written shift change report given to Aby Wilburn RN (oncoming nurse) by Marjan Arriaga RN (offgoing nurse). Report included the following information SBAR, Kardex, Intake/Output, MAR, Recent Results, Med Rec Status, Procedure Summary and Cardiac Rhythm Non-Telemetry. 
   
0715 - Shift assessment completed. Pt alert and oriented x1 to self, pt confused. No respiratory distress noted. No c/o pain reported. Call bell within reach, bed in low position. Will continue to monitor. 
   
0911 - Pt refusing to take PO medications at this time. Pt pulled out PIV #20 to right forearm. 
  
1930 - Pt had new PIV #22 placed to right forearm. 1215 - Shift re-assessment completed, no change in pt condition. 
   
1615 - Shift re-assessment completed, no change in pt condition. 
   
Bedside, Verbal and Written shift change report given to Marjan Arriaga RN (oncoming nurse) by Aby Wilburn RN (offgoing nurse). Report included the following information SBAR, Kardex, Intake/Output, MAR, Recent Results, Med Rec Status, Procedure Summary and Cardiac Rhythm Non-Telemetry.

## 2018-10-01 LAB
ANION GAP SERPL CALC-SCNC: 10 MMOL/L (ref 3–18)
BUN SERPL-MCNC: 15 MG/DL (ref 7–18)
BUN/CREAT SERPL: 26 (ref 12–20)
CALCIUM SERPL-MCNC: 8.1 MG/DL (ref 8.5–10.1)
CHLORIDE SERPL-SCNC: 103 MMOL/L (ref 100–108)
CK SERPL-CCNC: 253 U/L (ref 39–308)
CO2 SERPL-SCNC: 26 MMOL/L (ref 21–32)
CREAT SERPL-MCNC: 0.57 MG/DL (ref 0.6–1.3)
ERYTHROCYTE [DISTWIDTH] IN BLOOD BY AUTOMATED COUNT: 12.7 % (ref 11.6–14.5)
GLUCOSE SERPL-MCNC: 90 MG/DL (ref 74–99)
HCT VFR BLD AUTO: 34 % (ref 36–48)
HGB BLD-MCNC: 11.4 G/DL (ref 13–16)
MCH RBC QN AUTO: 31.1 PG (ref 24–34)
MCHC RBC AUTO-ENTMCNC: 33.5 G/DL (ref 31–37)
MCV RBC AUTO: 92.6 FL (ref 74–97)
PLATELET # BLD AUTO: 286 K/UL (ref 135–420)
PMV BLD AUTO: 9.8 FL (ref 9.2–11.8)
POTASSIUM SERPL-SCNC: 3.8 MMOL/L (ref 3.5–5.5)
RBC # BLD AUTO: 3.67 M/UL (ref 4.7–5.5)
SODIUM SERPL-SCNC: 139 MMOL/L (ref 136–145)
WBC # BLD AUTO: 9 K/UL (ref 4.6–13.2)

## 2018-10-01 PROCEDURE — 74011250637 HC RX REV CODE- 250/637: Performed by: INTERNAL MEDICINE

## 2018-10-01 PROCEDURE — 82550 ASSAY OF CK (CPK): CPT | Performed by: FAMILY MEDICINE

## 2018-10-01 PROCEDURE — 74011250636 HC RX REV CODE- 250/636: Performed by: NURSE PRACTITIONER

## 2018-10-01 PROCEDURE — 97535 SELF CARE MNGMENT TRAINING: CPT

## 2018-10-01 PROCEDURE — 97530 THERAPEUTIC ACTIVITIES: CPT

## 2018-10-01 PROCEDURE — 92610 EVALUATE SWALLOWING FUNCTION: CPT

## 2018-10-01 PROCEDURE — 65270000029 HC RM PRIVATE

## 2018-10-01 PROCEDURE — 74011250637 HC RX REV CODE- 250/637: Performed by: FAMILY MEDICINE

## 2018-10-01 PROCEDURE — 80048 BASIC METABOLIC PNL TOTAL CA: CPT | Performed by: FAMILY MEDICINE

## 2018-10-01 PROCEDURE — 92526 ORAL FUNCTION THERAPY: CPT

## 2018-10-01 PROCEDURE — 36415 COLL VENOUS BLD VENIPUNCTURE: CPT | Performed by: FAMILY MEDICINE

## 2018-10-01 PROCEDURE — 74011250637 HC RX REV CODE- 250/637: Performed by: NURSE PRACTITIONER

## 2018-10-01 PROCEDURE — 77030020186 HC BOOT HL PROTCT SAGE -B

## 2018-10-01 PROCEDURE — 85027 COMPLETE CBC AUTOMATED: CPT | Performed by: FAMILY MEDICINE

## 2018-10-01 RX ORDER — AMOXICILLIN 250 MG/5ML
250 POWDER, FOR SUSPENSION ORAL 3 TIMES DAILY
Status: DISCONTINUED | OUTPATIENT
Start: 2018-10-01 | End: 2018-10-02 | Stop reason: HOSPADM

## 2018-10-01 RX ADMIN — SODIUM CHLORIDE 75 ML/HR: 900 INJECTION, SOLUTION INTRAVENOUS at 10:13

## 2018-10-01 RX ADMIN — AMOXICILLIN 250 MG: 250 POWDER, FOR SUSPENSION ORAL at 18:53

## 2018-10-01 RX ADMIN — HEPARIN SODIUM 5000 UNITS: 5000 INJECTION INTRAVENOUS; SUBCUTANEOUS at 21:09

## 2018-10-01 RX ADMIN — HEPARIN SODIUM 5000 UNITS: 5000 INJECTION INTRAVENOUS; SUBCUTANEOUS at 06:39

## 2018-10-01 RX ADMIN — HEPARIN SODIUM 5000 UNITS: 5000 INJECTION INTRAVENOUS; SUBCUTANEOUS at 14:31

## 2018-10-01 RX ADMIN — LEVOFLOXACIN 750 MG: 750 TABLET, FILM COATED ORAL at 10:02

## 2018-10-01 RX ADMIN — ACETAMINOPHEN 650 MG: 325 TABLET, FILM COATED ORAL at 16:39

## 2018-10-01 RX ADMIN — SODIUM CHLORIDE 75 ML/HR: 900 INJECTION, SOLUTION INTRAVENOUS at 23:28

## 2018-10-01 RX ADMIN — AMOXICILLIN 250 MG: 250 POWDER, FOR SUSPENSION ORAL at 21:09

## 2018-10-01 NOTE — PROGRESS NOTES
Speech Pathology Could not progress with ST evaluation because patient :  
 
Jeanette.Penta ] was unresponsive. [ ] deferred due:  
[ ] was off the unit. [ ] on hold. [] NPO for procedure Speech Therapist will re-attempt evaluation later same day. Jenny Fleming M.S. CF-SLP
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
## 2018-10-01 NOTE — PROGRESS NOTES
1900  -- Bedside, Verbal and Written shift change report given to [address] (oncoming nurse) by Lisa Ugalde nurse). Report included the following information SBAR, Kardex, Intake/Output, MAR and Recent Results. Whiteboard update with diet order, requested unit secretary to order nectar thick liquids for pt. Aspiration caution sign placed at bedside. 
   
 2109 -- PM medications administered, pt tolerated with ease, will continue to monitor. 
  
 0015 -- Shift reassessment, pt condition unchanged, will continue to monitor. 
   
0400 --  Shift reassessment, pt condition unchanged, will continue to monitor.   
   
0700  -- Bedside, Verbal and Written shift change report given to [facility] nurse) by ROMINA (offgoing nurse). Report included the following information SBAR, Kardex, Intake/Output, MAR and Recent Results. Skin assessment completed.

## 2018-10-01 NOTE — PROGRESS NOTES
Chart reviewed. Transition plan remains SNF when medically stable. Have several accepting SNF's. Met with pt's dtr & made her aware of accepting SNF's & she has chosen [facility]. Spoke with Melani @ [facility] made her aware that pt's dtr has selected them. Will cont to follow. Dana MarquezRN,ext [phone].

## 2018-10-01 NOTE — PROGRESS NOTES
Problem: Self Care Deficits Care Plan (Adult) Goal: *Acute Goals and Plan of Care (Insert Text) Occupational Therapy Goals Initiated 9/27/2018 within 7 day(s). 1.  Patient will perform grooming with minimal assistance/contact guard assist seated in chair. 2.  Patient will perform upper body dressing with supervision/set-up. 3.  Patient will perform lower body dressing with minimal assistance/contact guard assist. 
4.  Patient will perform toilet transfers with minimal assistance/contact guard assist. 
5.  Patient will perform all aspects of toileting with minimal assistance/contact guard assist. 
6.  Patient will participate in upper extremity therapeutic exercise/activities with minimal assistance/contact guard assist for 10 minutes. 7.  Patient will utilize energy conservation techniques during functional activities with verbal, visual and tactile cues. Outcome: Progressing Towards Goal 
Occupational Therapy TREATMENT Patient: Maryam Shields (43 y.o. male) Date: 10/1/2018 Diagnosis: UTI (urinary tract infection) Chronic retention of urine Fall UTI (urinary tract infection) Precautions: Fall Chart, occupational therapy assessment, plan of care, and goals were reviewed. PLOF: Independent ASSESSMENT: 
Pt L side lying upon entry. Pt presents w/cervical and BLE contractures, winces in pain w/minimal movement. Pt requires Max Assist w/bed mobility to EOB and tolerates ~ 15 minutes w/close guarding. Pt requires manual assist for gentle cervical stretching and hand over hand assist w/simple ADL grooming tasks, hand hygiene. PT joined to co-treat part of the session for functional transfer training. Pt requires 2 person assist w/functional transfer to standing w/RW. Pt requires manual assist for cervical neutral in standing and bilateral knee extension. Pt returned to supine w/2 person assist. Donned Prevelon boots to prevent ulcers. EDUCATION Pt educated on importance OOB Progression toward goals: 
[]          Improving appropriately and progressing toward goals [x]          Improving slowly and progressing toward goals 
[]          Not making progress toward goals and plan of care will be adjusted PLAN: 
Patient continues to benefit from skilled intervention to address the above impairments. Continue treatment per established plan of care. Discharge Recommendations:  Jacoby Stevenson Further Equipment Recommendations for Discharge:  rolling walker and wheelchair SUBJECTIVE:  
Patient stated \"I need to use the bathroom.  OBJECTIVE DATA SUMMARY: 
  
G CODES:  Self Care  Current  CM= 80-99%  Goal  CJ= 20-39%. The severity rating is based on the Other functional assessment Cognitive/Behavioral Status: 
Neurologic State: Eyes open spontaneously Orientation Level: Oriented to person Cognition: Decreased attention/concentration, Decreased command following Safety/Judgement: Fall prevention Functional Mobility and Transfers for ADLs: 
 Bed Mobility: 
Rolling: Maximum assistance Supine to Sit: Maximum assistance Sit to Supine: Maximum assistance;Assist x2 Transfers: 
Sit to Stand: Maximum assistance;Assist x2 (w/RW) Balance: 
Sitting: Intact Standing: Impaired; With support Standing - Static: Poor Standing - Dynamic : Poor ADL Intervention: 
Grooming Washing Face: Minimum assistance Washing Hands: Maximum assistance (w/hand over hand assist) Upper Body Dressing Assistance Hospital Gown: Maximum assistance (at bed level) Lower Body Dressing Assistance Socks: Total assistance (dependent) Pain: 
Pre Treatment:0 Post Treatment:0 Pt winces in pain w/bed mobility rolling L/R and (B) knee extension Activity Tolerance:   
Poor Please refer to the flowsheet for vital signs taken during this treatment. After treatment:  
[]  Patient left in no apparent distress sitting up in chair [x]  Patient left in no apparent distress in bed w/PT 
[]  Call bell left within reach 
[]  Nursing notified 
[]  Caregiver present 
[]  Bed alarm activated Jai Elam Time Calculation: 26 mins

## 2018-10-01 NOTE — PROGRESS NOTES
Problem: Dysphagia (Adult) Goal: *Acute Goals and Plan of Care (Insert Text) Recommendations: 
Diet: soft solids with chopped meats / nectar thickened liquids Meds: crushed Aspiration Precautions Oral Care TID Goals:  Patient will: 1. Tolerate PO trials with 0 s/s overt distress in 4/5 trials 2. Utilize compensatory swallow strategies/maneuvers (decrease bite/sip, size/rate, alt. liq/sol) with min cues in 4/5 trials Outcome: Progressing Towards Goal 
 
Speech LAnguage Pathology bedside swallow  
evaluation & TREATMENT Patient: Maryam Shields (89 y.o. male) Date: 10/1/2018 Primary Diagnosis: UTI (urinary tract infection) Chronic retention of urine Fall Precautions: Aspiration  Fall PLOF: Independent ASSESSMENT : 
Based on the objective data described below, the patient presents with moderate oropharyngeal dysphagia in the setting of Parkinson's Disease. Patient sleeping upon therapist arrival, required intermittent verbal prompting and sternal rubbing to increase arousal level throughout tx session. Vocalized in response to SLP and followed commands in ~70% of opportunities. Was able to indicate y/n ~50%. Confused, but oriented to person and situation. Oral-motor exam revealed structures grossly intact for mastication and deglutition, however, oral mucosa appeared dry. Accepted SLP-fed trials of thin liquid via teaspoon, nectar-thickened liquid via straw, puree, soft solid, and solid. Labial seal adequate for intake of thin liquids, however, pt declined to swallow despite maximal prompts and expelled thin liquid. Accepted nectar thick liquids, puree, and mech soft solid with mildly delayed swallow reflex timing, but no s/sx aspiration. Hyolaryngeal elevation was adequate. Exhibited prolonged mastication and oral transit time for regular solid, and required verbal prompting to clear oral residue with NT liquid wash.  At this time, pt is safest for mechanical soft solids and nectar thickened liquids. D/w RN, Ezra. Skilled therapy initiated with max verbal cues for alternating solids/liquids and upright positioning to decrease aspiration risk and for facilitating swallow. (-) response from pt. Patient will benefit from skilled intervention to address the above impairments. Patients rehabilitation potential is considered to be Fair Factors which may influence rehabilitation potential include:  
[]            None noted [x]            Mental ability/status [x]            Medical condition []            Home/family situation and support systems [x]            Safety awareness 
[]            Pain tolerance/management []            Other: PLAN : 
Recommendations and Planned Interventions: 
mechanical soft solids and nectar thickened liquids Frequency/Duration: Patient will be followed by speech-language pathology 1-2 times per day/4-7 days per week to address goals. Discharge Recommendations: Jacoby Stevenson SUBJECTIVE:  
Patient stated Kemal Goode was all that commotion out there? . OBJECTIVE:  
 
Past Medical History:  
Diagnosis Date  Enlarged prostate  Nocturia  Parkinson's disease ([facility] [address]  Vitamin D deficiency History reviewed. No pertinent surgical history. Prior Level of Function/Home Situation: Independent Home Situation Home Environment: Private residence # Steps to Enter: 2 Rails to Enter: No 
One/Two Story Residence: One story Living Alone: Yes Support Systems: Child(true), Family member(s) Patient Expects to be Discharged to[de-identified] Private residence Current DME Used/Available at Home: None Tub or Shower Type: Tub/Shower combination Diet prior to admission: Regular / thin Current Diet:  Mech soft / Nectar thick Cognitive and Communication Status: 
Neurologic State: Philly Chavez Orientation Level: Oriented to person, Oriented to situation Cognition: Decreased attention/concentration, Follows commands Perception: Appears intact Perseveration: No perseveration noted Safety/Judgement: Decreased awareness of environment Oral Assessment: 
Oral Assessment Labial: Impaired coordination Dentition: Natural 
Oral Hygiene: dry Lingual: Decreased strength;Decreased rate; Incoordinated Velum: No impairment Mandible: No impairment P.O. Trials: 
Patient Position: HJL 19 Vocal quality prior to P.O.: Low volume;Breathy Consistency Presented: Solid;Puree; Thin liquid; Nectar thick liquid;Mechanical soft How Presented: SLP-fed/presented;Spoon;Straw;Cup/sip Bolus Acceptance: Impaired Bolus Formation/Control: Impaired Type of Impairment: Delayed;Mastication;Poor Propulsion: Delayed (# of seconds) Oral Residue: Less than 10% of bolus Initiation of Swallow: Delayed (# of seconds) Laryngeal Elevation: Functional 
Aspiration Signs/Symptoms: None Pharyngeal Phase Characteristics: Altered vocal quality Effective Modifications: Alternate liquids/solids;Straw;Small sips and bites Cues for Modifications: Moderate-maximal 
  
 
Oral Phase Severity: Moderate Pharyngeal Phase Severity : Mild GCODESwallowing:  Swallow Current Status CK= 40-59%  Swallow Goal Status CI= 1-19% The severity rating is based on the following outcomes: CHRIS Noms Swallow Level 4 Clinical Judgement PAIN: 
Start of Eval/Tx: 0 End of Eval/Tx: 0 After treatment:  
[]            Patient left in no apparent distress sitting up in chair 
[x]            Patient left in no apparent distress in bed 
[x]            Call bell left within reach [x]            Nursing notified []            Family present 
[]            Caregiver present 
[]            Bed alarm activated COMMUNICATION/EDUCATION:  
[x]            Aspiration precautions; swallow safety; compensatory techniques.  
[]            Patient/family have participated as able in goal setting and plan of care. []            Patient/family agree to work toward stated goals and plan of care. []            Patient understands intent and goals of therapy; neutral about participation. [x]            Patient unable to participate in goal setting/plan of care; educ ongoing with interdisciplinary staff 
[]         Posted safety precautions in patient's room. Thank you for this referral. 
Tito Pressley M.S. CF-SLP Time Calculation: 25 mins Evaluation Time: 15 minutes Treatment Time: 10 minutes

## 2018-10-01 NOTE — PROGRESS NOTES
Problem: Mobility Impaired (Adult and Pediatric) Goal: *Acute Goals and Plan of Care (Insert Text) Physical Therapy Goals Initiated 9/27/2018 and to be accomplished within 7 days. 1.  Patient will complete all bed mobility with moderate assistance  in order to prepare for EOB/OOB activity. 2.  Patient will perform sit <> stand with moderate assistance  in order to prepare for OOB/gait activity. 3.  Patient will perform bed to chair transfers with moderate assistance  in order to promote mobility and encourage seated activity to progress towards their prior level of function. 4.  Patient will ambulate 15 feet with minimal assistance/contact guard assist using LRAD in order to prepare for safe negotiation of their environment. Outcome: Progressing Towards Goal 
 
PHYSICAL THERAPY: Daily TREATMENT Note INPATIENT: Medicare: Hospital Day: 6 Patient: Mounika Blackwood (89 y.o. male)    Date: 10/1/2018 Primary Diagnosis: UTI (urinary tract infection) Chronic retention of urine Fall  
 ,  ,  
Precautions: Fall Chart, physical therapy assessment, plan of care and goals were reviewed. PLOF: Unknown ASSESSMENT: 
Patient sitting EOB, working with OT. Co-treated with OT to maximize patient participation. Patient sitting EOB. MAX x 2 for sit <> stand with manual assist and cuing for B knee extension and trunk extension in stance. Patient appears to now have B hamstring tightness which limits LE extension. Manual assist for symmetrical weight bearing in stance. Patient returned to bed with MAX. PROM of B knees performed to encourage knee extension. Prevalon boots placed and patient placed in chair position for upright positioning. Visual signs of pain noted with PROM of B LEs. Progression toward goals: 
      Improving slowly and progressing toward goals PLAN: 
Patient continues to benefit from skilled intervention to address the above impairments. Continue treatment per established plan of care. EDUCATION:  
Education:  Patient was educated on the following topics: Positioning in stance, needs reinforcement. Barriers to Learning/Limitations: yes;  altered mental status (i.e.Sedation, Confusion) Compensate with: visual, verbal, tactile, kinesthetic cues/model Discharge Recommendations:  Jacoby Stevenson Further Equipment Recommendations for Discharge:  TBD Factors which may impact discharge planning: N/A  
 
SUBJECTIVE:  
Patient stated I need to go to the bathroom.  OBJECTIVE DATA SUMMARY:  
Critical Behavior: 
Neurologic State: Eyes open spontaneously Orientation Level: Oriented to person Cognition: Decreased attention/concentration, Decreased command following Safety/Judgement: Fall prevention G CODE:Mobility [id number] Current  CM= 80-99%   Goal  CL= 60-79%. The severity rating is based on the Other MAX A for mobility [address] Standing Balance Scale 
0: Pt performs 25% or less of standing activity (Max assist) CN, 100% impaired. 1: Pt supports self with upper extremities but requires therapist assistance. Pt performs 25-50% of effort (Mod assist) CM, 80% to <100% impaired. 1+: Pt supports self with upper extremities but requires therapist assistance. Pt performs >50% effort. (Min assist). CL, 60% to <80% impaired. 2: Pt supports self independently with both upper extremities (walker, crutches, parallel bars). CL, 60% to <80% impaired. 2+: Pt support self independently with 1 upper extremity (cane, crutch, 1 parallel bar). CK, 40% to <60% impaired. 3: Pt stands without upper extremity support for up to 30 seconds. CK, 40% to <60% impaired. 3+: Pt stands without upper extremity support for 30 seconds or greater. CJ, 20% to <40% impaired. 4: Pt independently moves and returns center of gravity 1-2 inches in one plane. CJ, 20% to <40% impaired. 4+: Pt independently moves and returns center of gravity 1-2 inches in multiple planes. CI, 1% to <20% impaired. 5: Pt independently moves and returns center of gravity in all planes greater than 2 inches. CH, 0% impaired. Functional Mobility: 
 
 
Functional Status Indep (I) Mod I Super-vision Min A Mod A Max A Total A Assist x2 Verbal cues Additional time Not tested Comments Rolling []  []  [] []    []    []  []  [] [] [] [x] Supine to sit []  []  [] []  []  []  []  [] [] [] [x] Sit to supine []  []  [] []  []  [x]  []  [] [] [] [] Sit to stand []  []  [] []  []  [x]  []  [] [] [] []   
Stand to sit []  []  [] []  []  [x]  []  [x] [] [] [x] Bed to chair transfers []  []  [] []  []  []  []  [] [] [] [] Balance Good Mary Velez Poor Unable Not tested Comments Sitting static []  [x]  []  []  [] Sitting dynamic []  [x]  []  []  []   
Standing static []  []  [x]  []  []   
Standing dynamic []  []  [x]  []  []   
 
Vital Signs Temp: 98.8 °F (37.1 °C) Pulse (Heart Rate): 67    
BP: 122/66 Resp Rate: 20    
O2 Sat (%): 98 % Pain: pain with PROM B knee extension Activity Tolerance:  
Fair After treatment:  
Patient left in no apparent distress in bed Call bell left within reach [facility] Time Calculation: 23 mins

## 2018-10-01 NOTE — PROGRESS NOTES
Problem: Pressure Injury - Risk of 
Goal: *Prevention of pressure injury Document Salvador Scale and appropriate interventions in the flowsheet. Outcome: Progressing Towards Goal 
Pressure Injury Interventions: 
Sensory Interventions: Assess need for specialty bed Moisture Interventions: Internal/External fecal devices Activity Interventions: PT/OT evaluation Mobility Interventions: Pressure redistribution bed/mattress (bed type) Nutrition Interventions: Document food/fluid/supplement intake Friction and Shear Interventions: HOB 30 degrees or less Problem: Falls - Risk of 
Goal: *Absence of Falls Document Anya Garcia Fall Risk and appropriate interventions in the flowsheet. Outcome: Progressing Towards Goal 
Fall Risk Interventions: 
Mobility Interventions: Bed/chair exit alarm Mentation Interventions: Bed/chair exit alarm Medication Interventions: Evaluate medications/consider consulting pharmacy Elimination Interventions: Call light in reach History of Falls Interventions: Bed/chair exit alarm

## 2018-10-01 NOTE — ROUTINE PROCESS
0730 Bedside, Verbal and Written shift change report given to [address] [facility] (oncoming nurse) by Bobby Black RN (offgoing nurse). Report included the following information SBAR and Kardex. 1640 tylenol given for low grade fever 99.7

## 2018-10-01 NOTE — PROGRESS NOTES
Hospitalist Progress Note Troy San MD 
Internal medicine/ Hospitalist 
 
Daily Progress Note: 10/1/2018 8:27 AM 
   
Interval history / Subjective:  
Melanie Goss a 70 y. o. male with a history of Parkinson's disease, BPH who presented to the ED for complaints of fall with unknown downtime. Patient was found by daughter down on bathroom floor and apparently was last seen normal evening before ER visit. Unclear if patient hit his head. Hx was limited 2/2 patient condition. Family noted patient has been steadily declining in regards to conditioning however has refused to see PCP. Patient lives by himself. Patient reported his legs gave out. In ED patient was found to have UTI. He was admitted for further eval 
9/27: Patient appears more confused today. 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 Blood cx + GPR. 9/28. Urine culture + GNR. ID following recommend continuing rocephin for UTI however no further follow up on blood cx . Urine culture + E coli  With sensitivity noted. 9/30: Patient will be switched from Rocephin IV to po Levaquin today. Patient at his baseline . I spoke with the daughter today , she confirmed the parkinson is non diagnosed for over 10 years. He also had bph and is on /off with urology. He self cath him self but recently has not been able to do it. 
   
Current Facility-Administered Medications Medication Dose Route Frequency  levoFLOXacin (LEVAQUIN) tablet 750 mg  750 mg Oral Q24H  
 LORazepam (ATIVAN) injection 1 mg  1 mg IntraVENous Q6H PRN  
 0.9% sodium chloride infusion  75 mL/hr IntraVENous CONTINUOUS  
 acetaminophen (TYLENOL) tablet 650 mg  650 mg Oral Q4H PRN  
 HYDROcodone-acetaminophen (NORCO) 5-325 mg per tablet 1 Tab  1 Tab Oral Q4H PRN  
 heparin (porcine) injection 5,000 Units  5,000 Units SubCUTAneous Q8H  potassium chloride SR (KLOR-CON 10) tablet 40 mEq  40 mEq Oral DAILY Review of Systems Awake,follows commands. Limited interaction. Objective:  
 
Visit Vitals  /60  Pulse 60  Temp 98.9 °F (37.2 °C)  Resp 20  
 Ht 5' 11\" (1.803 m)  Wt 60.3 kg (133 lb)  SpO2 98%  BMI 18.55 kg/m2 O2 Device: Room air Temp (24hrs), Av.7 °F (37.1 °C), Min:97.9 °F (36.6 °C), Max:99.7 °F (37.6 °C) 
 
 
  
 190 - 10/01 0700 In: 3293.8 [P.O.:620; I.V.:2673.8] Out: 2651 Davionashley Mcclure Physical Exam  
Constitutional:NAD,follows few commands. HEENT:at/nc,mouth moist,anicteric. Neck: Neck supple. Cardiovascular: Normal rate, regular rhythm and normal heart sounds. No murmur heard. Pulmonary/Chest: Effort normal and breath sounds normal. No respiratory distress. He has no wheezes. He has no rales. Abdominal: Soft. Bowel sounds are normal. He exhibits no distension. There is no tenderness. There is no guarding. Musculoskeletal: He exhibits edema. Neurological: He is alert. Data Review Recent Results (from the past 12 hour(s)) CK Collection Time: 10/01/18  4:41 AM  
Result Value Ref Range  39 - 308 U/L  
CBC W/O DIFF Collection Time: 10/01/18  4:41 AM  
Result Value Ref Range WBC 9.0 4.6 - 13.2 K/uL  
 RBC 3.67 (L) 4.70 - 5.50 M/uL  
 HGB 11.4 (L) 13.0 - 16.0 g/dL HCT 34.0 (L) 36.0 - 48.0 % MCV 92.6 74.0 - 97.0 FL  
 MCH 31.1 24.0 - 34.0 PG  
 MCHC 33.5 31.0 - 37.0 g/dL  
 RDW 12.7 11.6 - 14.5 % PLATELET 161 667 - 362 K/uL MPV 9.8 9.2 - 90.9 FL  
METABOLIC PANEL, BASIC Collection Time: 10/01/18  4:41 AM  
Result Value Ref Range Sodium 139 136 - 145 mmol/L Potassium 3.8 3.5 - 5.5 mmol/L Chloride 103 100 - 108 mmol/L  
 CO2 26 21 - 32 mmol/L Anion gap 10 3.0 - 18 mmol/L Glucose 90 74 - 99 mg/dL BUN 15 7.0 - 18 MG/DL Creatinine 0.57 (L) 0.6 - 1.3 MG/DL  
 BUN/Creatinine ratio 26 (H) 12 - 20 GFR est AA >60 >60 ml/min/1.73m2 GFR est non-AA >60 >60 ml/min/1.73m2  Calcium 8.1 (L) 8.5 - 10.1 MG/DL  
 
 
 
 Assessment/Plan:  
 
Principal Problem: 
  UTI (urinary tract infection) (9/26/2018) Active Problems: 
  Fall (9/26/2018) Chronic retention of urine (9/26/2018) Encephalopathy (9/26/2018) Hypokalemia (9/26/2018) Rhabdomyolysis (9/26/2018) Bacteremia (9/27/2018) Care Plan UTI  
- complicated UTI  
- Rocephin  
- urine cx + E coli sensitivity noted - switched from rocephin  to po Levaquin on 9/30 for another 5 days  
  
Encephalopathy - Improving. Spoke with daughter,visiting,saying patient looks at his recent baseline. - 2/2 above - CT head 9/26  noted  
  
Bacteremia - blood cx 9/26 + gnr 
- Rocephin - ID consulted , no further recommendations d/c'd ampicillin  
  
Rhabdomyolysis - Mild , likely traumatic  
- IVF 
- CK 2282 on admission 
-  CK trending down 544 today -  Resolved 
  
Hx Parkinson's - tremors - No evidence of treatment and patient hx unreliable - Neurology recommend out patient follow up on discharge  
  Hx BPH  
- Follow up with urology out patient  
  
Fall - PT eval recommend SNF  
- fall precaution  
  
Chronic urine retention  
- tadeo cath  
- self cath PTA 
  
Hypokalemia  
- replaced Debility: 
-Patient has advanced Parkinson's disease. He will benefit from SNF. I explained to daughter here visiting that patient's unlikely to be back to his previous condition of being able to live alone and care for himself. The daughter has stated that patient has declined a lot recently,not able to do straight cath anymore,not able to perform ADL.   
DVT prophylaxis Full code Disposition - SNF 10/1 .   working on placement.

## 2018-10-01 NOTE — PROGRESS NOTES
1125: PT treatment session attempted; patient sleeping soundly and does not arouse to voice. Will f/u with patient. Ahmet Shelton PT, DPT Office extension: [phone] Pager #: [phone]

## 2018-10-02 VITALS
BODY MASS INDEX: 19.75 KG/M2 | HEART RATE: 69 BPM | HEIGHT: 71 IN | OXYGEN SATURATION: 94 % | RESPIRATION RATE: 18 BRPM | TEMPERATURE: 97.9 F | DIASTOLIC BLOOD PRESSURE: 62 MMHG | WEIGHT: 141.1 LBS | SYSTOLIC BLOOD PRESSURE: 110 MMHG

## 2018-10-02 PROBLEM — G20 PARKINSON'S DISEASE (HCC): Status: ACTIVE | Noted: 2018-10-02

## 2018-10-02 PROBLEM — N40.0 BPH (BENIGN PROSTATIC HYPERPLASIA): Status: ACTIVE | Noted: 2018-10-02

## 2018-10-02 LAB
BACTERIA SPEC CULT: NORMAL
CK SERPL-CCNC: 164 U/L (ref 39–308)
SERVICE CMNT-IMP: NORMAL

## 2018-10-02 PROCEDURE — 74011250636 HC RX REV CODE- 250/636: Performed by: NURSE PRACTITIONER

## 2018-10-02 PROCEDURE — 77030037878 HC DRSG MEPILEX >48IN BORD MOLN -B

## 2018-10-02 PROCEDURE — 92526 ORAL FUNCTION THERAPY: CPT

## 2018-10-02 PROCEDURE — 74011250637 HC RX REV CODE- 250/637: Performed by: FAMILY MEDICINE

## 2018-10-02 PROCEDURE — 82550 ASSAY OF CK (CPK): CPT | Performed by: FAMILY MEDICINE

## 2018-10-02 PROCEDURE — 74011250637 HC RX REV CODE- 250/637: Performed by: INTERNAL MEDICINE

## 2018-10-02 PROCEDURE — 36415 COLL VENOUS BLD VENIPUNCTURE: CPT | Performed by: FAMILY MEDICINE

## 2018-10-02 RX ORDER — HYDROCODONE BITARTRATE AND ACETAMINOPHEN 5; 325 MG/1; MG/1
1 TABLET ORAL
Qty: 12 TAB | Refills: 0 | Status: SHIPPED | OUTPATIENT
Start: 2018-10-02

## 2018-10-02 RX ORDER — ACETAMINOPHEN 325 MG/1
650 TABLET ORAL
Qty: 30 TAB | Refills: 0 | Status: SHIPPED
Start: 2018-10-02

## 2018-10-02 RX ORDER — AMOXICILLIN 250 MG/5ML
250 POWDER, FOR SUSPENSION ORAL 3 TIMES DAILY
Qty: 75 ML | Refills: 0 | Status: SHIPPED
Start: 2018-10-02

## 2018-10-02 RX ADMIN — POTASSIUM CHLORIDE 40 MEQ: 750 TABLET, FILM COATED, EXTENDED RELEASE ORAL at 08:36

## 2018-10-02 RX ADMIN — AMOXICILLIN 250 MG: 250 POWDER, FOR SUSPENSION ORAL at 08:37

## 2018-10-02 RX ADMIN — HEPARIN SODIUM 5000 UNITS: 5000 INJECTION INTRAVENOUS; SUBCUTANEOUS at 05:55

## 2018-10-02 NOTE — ANCILLARY DISCHARGE INSTRUCTIONS
Patient and/or next of kin has been given the [facility] Important Message From Medicare About Your Rights\" letter and all questions were answered.

## 2018-10-02 NOTE — PROGRESS NOTES
Problem: Pressure Injury - Risk of 
Goal: *Prevention of pressure injury Document Salvador Scale and appropriate interventions in the flowsheet. Outcome: Progressing Towards Goal 
Pressure Injury Interventions: 
Sensory Interventions: Assess changes in LOC, Keep linens dry and wrinkle-free Moisture Interventions: Internal/External urinary devices Activity Interventions: PT/OT evaluation Mobility Interventions: Pressure redistribution bed/mattress (bed type) Nutrition Interventions: Document food/fluid/supplement intake Friction and Shear Interventions: HOB 30 degrees or less Problem: Falls - Risk of 
Goal: *Absence of Falls Document Stephanie Zafar Fall Risk and appropriate interventions in the flowsheet. Outcome: Progressing Towards Goal 
Fall Risk Interventions: 
Mobility Interventions: Bed/chair exit alarm Mentation Interventions: Adequate sleep, hydration, pain control, Bed/chair exit alarm, Door open when patient unattended, Eyeglasses and hearing aids, More frequent rounding, Reorient patient, Room close to nurse's station, Update white board Medication Interventions: Evaluate medications/consider consulting pharmacy Elimination Interventions: Call light in reach, Patient to call for help with toileting needs, Toileting schedule/hourly rounds History of Falls Interventions: Bed/chair exit alarm, Door open when patient unattended, Evaluate medications/consider consulting pharmacy, Investigate reason for fall

## 2018-10-02 NOTE — PROGRESS NOTES
Transportation at Discharge: 10/2/2018 Transport Company/Representative: [employer] / [employer] Phone number: [phone] Method of Transport: Stretcher/BLS Estimated pick-up time:1:30P Destination: [facility] and [address] [place] [address] Insurance Info: Medicare / [employer] Authorization: No auth required Requesting Outcomes Manager: Lennox Ala Lura Risen, Care- ext [phone]

## 2018-10-02 NOTE — DISCHARGE SUMMARY
Discharge Summary    Patient: Stewart Romero               Sex: male          DOA: 9/26/2018         YOB: 1940      Age:  70 y.o.        LOS:  LOS: 6 days                Admit Date: 9/26/2018    Discharge Date: 10/2/2018    Admission Diagnoses: UTI (urinary tract infection)  Chronic retention of urine  Fall    Discharge Diagnoses:    Problem List as of 10/2/2018  Never Reviewed          Codes Class Noted - Resolved    Bacteremia ICD-10-CM: R78.81  ICD-9-CM: 790.7  9/27/2018 - Present        * (Principal)UTI (urinary tract infection) ICD-10-CM: N39.0  ICD-9-CM: 599.0  9/26/2018 - Present        Fall ICD-10-CM: W19. Iveth Fines  ICD-9-CM: E888.9  9/26/2018 - Present    Parkinson's disease    BPH      Chronic retention of urine ICD-10-CM: R33.9  ICD-9-CM: 788.29  9/26/2018 - Present        Encephalopathy ICD-10-CM: G93.40  ICD-9-CM: 348.30  9/26/2018 - Present        Hypokalemia ICD-10-CM: E87.6  ICD-9-CM: 276.8  9/26/2018 - Present        Rhabdomyolysis ICD-10-CM: M62.82  ICD-9-CM: 728.88  9/26/2018 - Present              Discharge Medications:     Current Discharge Medication List      START taking these medications    Details   acetaminophen (TYLENOL) 325 mg tablet Take 2 Tabs by mouth every four (4) hours as needed. Qty: 30 Tab, Refills: 0      amoxicillin (AMOXIL) 250 mg/5 mL suspension Take 5 mL by mouth three (3) times daily. Qty: 75 mL, Refills: 0      HYDROcodone-acetaminophen (NORCO) 5-325 mg per tablet Take 1 Tab by mouth every four (4) hours as needed. Max Daily Amount: 6 Tabs.   Qty: 12 Tab, Refills: 0    Associated Diagnoses: Fall, initial encounter             Follow-up:pcp    Discharge Condition: Good    Activity: Activity as tolerated    Diet: Diet: soft solids with chopped meats / nectar thickened liquids           Meds: crushed           Aspiration Precautions          Oral Care TID    Wound Care: As directed    Labs:  Labs: Results:       Chemistry Recent Labs      10/01/18   0441   GLU  90 NA  139   K  3.8   CL  103   CO2  26   BUN  15   CREA  0.57*   CA  8.1*   AGAP  10   BUCR  26*      CBC w/Diff Recent Labs      10/01/18   0441   WBC  9.0   RBC  3.67*   HGB  11.4*   HCT  34.0*   PLT  286      Cardiac Enzymes Recent Labs      10/02/18   0415  10/01/18   0441   CPK  164  253      Coagulation No results for input(s): PTP, INR, APTT in the last 72 hours. No lab exists for component: INREXT    Lipid Panel Lab Results   Component Value Date/Time    Cholesterol, total 193 08/12/2010 07:25 AM    HDL Cholesterol 49 08/12/2010 07:25 AM    LDL, calculated 134.4 (H) 08/12/2010 07:25 AM    VLDL, calculated 9.6 08/12/2010 07:25 AM    Triglyceride 48 08/12/2010 07:25 AM    CHOL/HDL Ratio 3.9 08/12/2010 07:25 AM      BNP No results for input(s): BNPP in the last 72 hours. Liver Enzymes No results for input(s): TP, ALB, TBIL, AP, SGOT, GPT in the last 72 hours. No lab exists for component: DBIL   Thyroid Studies No results found for: T4, T3U, TSH, TSHEXT       Imaging:  [unfilled]    Consults: Infectious Disease    Treatment Team: Treatment Team: Attending Provider: Evelia Mena MD; Hospitalist: Baudilio Jordan MD; Utilization Review: Benjamin Muller RN; Care Manager: Alber Smith; Physical Therapist: Con Lundborg; Occupational Therapy Assistant: SHELBY Orourke    Significant Diagnostic Studies: labs: see recent lab results    Hospital Course:  Delphine Velasco a 70 y. o. male with a history of Parkinson's disease, BPH who presented to the ED for complaints of fall with unknown downtime. Patient was found by daughter down on bathroom floor and apparently was last seen normal evening before ER visit. Unclear if patient hit his head. Hx was limited 2/2 patient condition. Family noted patient has been steadily declining in regards to conditioning however has refused to see PCP. Patient lives by himself. Patient reported his legs gave out. In ED patient was found to have UTI.  He was admitted for further eval.On 9/27,patient appeared more confused,saying \"he wants to register his truck now\",thinking it was 2018 August. Blood cx + GPRs 9/28. Urine culture + GNR. ID recommended continuing rocephin for UTI however no further follow up on blood cx . Urine culture + E coli with sensitivity noted. On 9/30,patient was switched from Rocephin IV to po Levaquin. On 10/1,levaquin was dced,ampicillin elixir. Patient at his baseline . Patient's daughter who visited on 10/1 has indicated that patient has Parkinson's disease for 10 years and his condition has been getting worse. He also has bph and has followed with urology on /off. He self cath him self but recently has not been able to do it. With the progression of his Parkinson's disease and not being able to care for himself,It was determined that SNF was appropriate for him. So  has made the arrangement. Patient will be discharged today to SNF as he is medically stable. He will follow up with neurology,pcp,urology. Time for discharge:40 minutes. Discharge plan was discussed with patient's daughter,.     Venancio Hernandez MD  October 2, 2018

## 2018-10-02 NOTE — PROGRESS NOTES
Problem: Dysphagia (Adult) Goal: *Acute Goals and Plan of Care (Insert Text) Recommendations: 
Diet: soft solids with chopped meats / thin liquids (no straws) Meds: crushed Aspiration Precautions Oral Care TID Goals:  Patient will: 1. Tolerate PO trials with 0 s/s overt distress in 4/5 trials 2. Utilize compensatory swallow strategies/maneuvers (decrease bite/sip, size/rate, alt. liq/sol) with min cues in 4/5 trials Outcome: Progressing Towards Goal 
Speech language pathology dysphagia treatment Patient: Zoë Bush (89 y.o. male) Date: 10/2/2018 Diagnosis: UTI (urinary tract infection) Chronic retention of urine Fall UTI (urinary tract infection) Precautions: Aspiration, Fall PLOF:Independent ASSESSMENT: 
Pt seen for dysphagia tx this am accepting trials of thin liquid +straw and solid. Alert and oriented to person and place. Slightly delayed swallow timing/reflex, however hyolaryngeal excursion adequate to palpation. Weak cough observed after successive swallows of thin liquid with straw and cup gulp, which resolved with single sips. Mildly delayed oral transit and bolus prep observed with solid, however swallow reflex adequate and no aspiration s/sx observed. Pt stated that tough meats are difficult to chew and swallow. At this time, pt safe for dental soft diet with chopped meats, and thin liquid via cup sip. No straws. Single sips only. SLP will continue to follow. D/w RN, Shalonda Reid. Progression toward goals: 
[x]         Improving appropriately and progressing toward goals 
[]         Improving slowly and progressing toward goals 
[]         Not making progress toward goals and plan of care will be adjusted PLAN: 
Recommendations and Planned Interventions: 
dental soft diet with chopped meats, and thin liquid via cup sip. No straws. Single sips only.  
Patient continues to benefit from skilled intervention to address the above impairments. Continue treatment per established plan of care. Discharge Recommendations:  Jacoby Stevenson SUBJECTIVE:  
Patient stated One sip at a time. OBJECTIVE:  
Cognitive and Communication Status: 
Neurologic State: Alert, Confused Orientation Level: Disoriented to situation, Disoriented to time, Oriented to person, Oriented to place Cognition: Decreased attention/concentration, Memory loss Perception: Appears intact Perseveration: No perseveration noted Safety/Judgement: Decreased awareness of environment Dysphagia Treatment: 
Oral Assessment: 
Oral Assessment Labial: Impaired coordination Dentition: Natural 
Oral Hygiene: dry Lingual: Decreased strength, Decreased rate, Incoordinated Velum: No impairment Mandible: No impairment P.O. Trials: 
 Patient Position: TGF 05 Vocal quality prior to P.O.: Low volume, Breathy Consistency Presented: Solid, Puree, Thin liquid, Nectar thick liquid, Mechanical soft How Presented: SLP-fed/presented, Spoon, Straw, Cup/sip Bolus Acceptance: Impaired Bolus Formation/Control: Impaired Type of Impairment: Delayed, Mastication, Poor Propulsion: Delayed (# of seconds) Oral Residue: Less than 10% of bolus Initiation of Swallow: Delayed (# of seconds) Laryngeal Elevation: Functional 
 Aspiration Signs/Symptoms: None Pharyngeal Phase Characteristics: Altered vocal quality Effective Modifications: Alternate liquids/solids, Straw, Small sips and bites Cues for Modifications: Moderate-maximal 
   
 
 Oral Phase Severity: Moderate Pharyngeal Phase Severity : Mild PAIN: 
Start of Tx: 0 End of Tx: 0  
 
GCODESwallowing:  Swallow Current Status CJ= 20-39%  Swallow Goal Status CI= 1-19% The severity rating is based on the following outcomes: CHRIS Noms Swallow Level 5 Clinical Judgement After treatment:  
[]              Patient left in no apparent distress sitting up in chair [x]              Patient left in no apparent distress in bed 
[x]              Call bell left within reach [x]              Nursing notified 
[x]              Family present 
[]              Caregiver present 
[]              Bed alarm activated COMMUNICATION/EDUCATION:  
[x] Aspiration precautions; swallow safety; compensatory techniques []        Patient unable to participate in education; education ongoing with staff 
[]  Posted safety precautions in patient's room. [] Oral-motor/laryngeal strengthening exercises Alissa Delgado M.S. CF-SLP Time Calculation: 15 mins

## 2018-10-02 NOTE — PROGRESS NOTES
Pt accepted to [facility]., pt to transfer today via [address] transport @ 1330. Met with pt & his dtr, both agreeable to transfer. Nursing made aware of above. Available as needed. Dana Marquez RN,ext [phone]. Care Management Interventions PCP Verified by CM: Yes (no PCP) Palliative Care Criteria Met (RRAT>21 & CHF Dx)?: No 
Mode of Transport at Discharge: BLS Transition of Care Consult (CM Consult): SNF Partner SNF: Yes Physical Therapy Consult: Yes Occupational Therapy Consult: Yes Current Support Network: Lives Alone Confirm Follow Up Transport: Family Plan discussed with Pt/Family/Caregiver: Yes Freedom of Choice Offered: Yes Discharge Location Discharge Placement: Skilled nursing facility ([facility].)

## 2018-10-02 NOTE — PROGRESS NOTES
conducted a Follow up consultation and Spiritual Assessment for [facility]IL[facility], who is a 70 y.o.,male. The  provided the following Interventions: 
Continued the relationship of care and support. Listened empathically. Offered prayer and assurance of continued prayer on patients behalf. Chart reviewed. The following outcomes were achieved: 
Patient expressed gratitude for 's visit. Assessment: 
There are no spiritual or Christianity issues which require intervention at this time. Plan: 
Chaplains will continue to follow and will provide pastoral care on an as needed/requested basis.  recommends bedside caregivers page  on duty if patient shows signs of acute spiritual or emotional distress. Checo Hollins M.Div. , [address] [address] [facility]: [phone]/[facility]: [phone]